# Patient Record
Sex: MALE | Race: WHITE | NOT HISPANIC OR LATINO | Employment: OTHER | URBAN - NONMETROPOLITAN AREA
[De-identification: names, ages, dates, MRNs, and addresses within clinical notes are randomized per-mention and may not be internally consistent; named-entity substitution may affect disease eponyms.]

---

## 2019-09-10 ENCOUNTER — OFFICE VISIT (OUTPATIENT)
Dept: FAMILY MEDICINE CLINIC | Facility: CLINIC | Age: 49
End: 2019-09-10

## 2019-09-10 VITALS
SYSTOLIC BLOOD PRESSURE: 122 MMHG | OXYGEN SATURATION: 98 % | BODY MASS INDEX: 37.64 KG/M2 | WEIGHT: 239.8 LBS | RESPIRATION RATE: 16 BRPM | HEART RATE: 83 BPM | TEMPERATURE: 98.3 F | DIASTOLIC BLOOD PRESSURE: 84 MMHG | HEIGHT: 67 IN

## 2019-09-10 DIAGNOSIS — R42 LIGHTHEADEDNESS: ICD-10-CM

## 2019-09-10 DIAGNOSIS — J45.20 MILD INTERMITTENT ASTHMA, UNSPECIFIED WHETHER COMPLICATED: ICD-10-CM

## 2019-09-10 DIAGNOSIS — Z13.29 SCREENING FOR THYROID DISORDER: ICD-10-CM

## 2019-09-10 DIAGNOSIS — Z13.220 SCREENING FOR LIPOID DISORDERS: ICD-10-CM

## 2019-09-10 DIAGNOSIS — Z13.0 SCREENING FOR DEFICIENCY ANEMIA: ICD-10-CM

## 2019-09-10 DIAGNOSIS — R73.03 PREDIABETES: Primary | ICD-10-CM

## 2019-09-10 LAB
POC CREATININE URINE: ABNORMAL
POC MICROALBUMIN URINE: ABNORMAL

## 2019-09-10 PROCEDURE — 82570 ASSAY OF URINE CREATININE: CPT | Performed by: FAMILY MEDICINE

## 2019-09-10 PROCEDURE — 82044 UR ALBUMIN SEMIQUANTITATIVE: CPT | Performed by: FAMILY MEDICINE

## 2019-09-10 PROCEDURE — 99204 OFFICE O/P NEW MOD 45 MIN: CPT | Performed by: FAMILY MEDICINE

## 2019-09-10 RX ORDER — ALBUTEROL SULFATE 90 UG/1
2 AEROSOL, METERED RESPIRATORY (INHALATION) EVERY 4 HOURS PRN
Qty: 18 G | Refills: 5 | Status: SHIPPED | OUTPATIENT
Start: 2019-09-10 | End: 2020-12-17 | Stop reason: SDUPTHER

## 2019-09-10 NOTE — PATIENT INSTRUCTIONS

## 2019-09-10 NOTE — PROGRESS NOTES
Subjective   Carlos Reese is a 48 y.o. male. Presents today to establish care to be evaluated for sporadic dizziness/lightheadness. Also would like to discuss med management for asthma, needing inhalers and nebulizer soultion. Patient requesting fasting labs.     History of Present Illness   New patient - No medical treatment in the past 3 years    Dizziness and lightheadedness- Hx of prior heat strokes.  Heat can cause him to have muscle spasms and feeling dizzy.  Heat from a bath can do this too.  Also a hx of tremors.      Asthma- Currently controlled.  Very few triggers with 1-2 attacks a year.  Triggered by acetone, fingernail polish, perfumes, or any other strong fumes.  No history of allergy triggers.  On no inhalers currently.      Prediabetes - Not checked in 3 years.  Last A1c 6.4.  He is not fasting today.  No new numbness of tingling.  No eye exam.  He has been drinking a lot of fluid more than usual and peeing a lot and wanting to eat.    The following portions of the patient's history were reviewed and updated as appropriate: allergies, current medications, past family history, past medical history, past social history, past surgical history and problem list.    Review of Systems   Constitutional: Negative for activity change, appetite change, fatigue and fever.   HENT: Negative for ear pain, facial swelling and sore throat.    Eyes: Negative for discharge and itching.   Respiratory: Negative for cough, chest tightness and shortness of breath.    Cardiovascular: Negative for chest pain and palpitations.   Gastrointestinal: Negative for abdominal distention and constipation.   Endocrine: Negative for polydipsia, polyphagia and polyuria.   Genitourinary: Negative for difficulty urinating and flank pain.   Musculoskeletal: Negative for arthralgias and back pain.   Skin: Negative for color change, rash and wound.   Allergic/Immunologic: Negative for environmental allergies and food allergies.    Neurological: Negative for weakness and numbness.   Hematological: Negative for adenopathy. Does not bruise/bleed easily.   Psychiatric/Behavioral: Negative for decreased concentration and dysphoric mood. The patient is not nervous/anxious.    All other systems reviewed and are negative.      Objective   Physical Exam   Constitutional: He is oriented to person, place, and time. He appears well-developed and well-nourished. No distress.   HENT:   Mouth/Throat: Oropharynx is clear and moist. No oropharyngeal exudate.   Eyes: Conjunctivae are normal. Right eye exhibits no discharge. Left eye exhibits no discharge.   Neck: Normal range of motion. Neck supple.   Cardiovascular: Normal rate, regular rhythm and normal heart sounds. Exam reveals no gallop and no friction rub.   No murmur heard.  Pulmonary/Chest: Effort normal and breath sounds normal. He has no wheezes. He has no rales.   Abdominal: Soft. Bowel sounds are normal. He exhibits no distension. There is no tenderness.   Musculoskeletal: He exhibits no edema or deformity.   Lymphadenopathy:     He has no cervical adenopathy.   Neurological: He is alert and oriented to person, place, and time.   Skin: Skin is warm and dry. No rash noted.   Psychiatric: He has a normal mood and affect. His behavior is normal.   Nursing note and vitals reviewed.      Assessment/Plan   Carlos was seen today for establish care, dizziness and med management.    Diagnoses and all orders for this visit:    Prediabetes  -     Comprehensive Metabolic Panel; Future  -     Hemoglobin A1c; Future  -     Hemoglobin A1c  -     Comprehensive Metabolic Panel  -     POCT microalbumin    Mild intermittent asthma, unspecified whether complicated  -     albuterol sulfate  (90 Base) MCG/ACT inhaler; Inhale 2 puffs Every 4 (Four) Hours As Needed for Wheezing.    Lightheadedness    Screening for lipoid disorders  -     Lipid Panel; Future  -     Lipid Panel    Screening for deficiency  anemia  -     CBC (No Diff); Future  -     CBC (No Diff)    Screening for thyroid disorder  -     TSH Rfx On Abnormal To Free T4; Future  -     TSH Rfx On Abnormal To Free T4      I will get some labs to look into the prediabetes as well as the lightheadedness.  I prescribed them a inhaler for the asthma.  I am very concerned that this is a turn into diabetes and will have him come back for a follow-up in the next week or so if it turns out that he is diabetic so we can get him on to treatment.

## 2019-09-12 LAB
ALBUMIN SERPL-MCNC: 4.4 G/DL (ref 3.5–5.2)
ALBUMIN/GLOB SERPL: 1.9 G/DL
ALP SERPL-CCNC: 60 U/L (ref 39–117)
ALT SERPL-CCNC: 44 U/L (ref 1–41)
AST SERPL-CCNC: 21 U/L (ref 1–40)
BILIRUB SERPL-MCNC: 0.4 MG/DL (ref 0.2–1.2)
BUN SERPL-MCNC: 14 MG/DL (ref 6–20)
BUN/CREAT SERPL: 23 (ref 7–25)
CALCIUM SERPL-MCNC: 9.1 MG/DL (ref 8.6–10.5)
CHLORIDE SERPL-SCNC: 100 MMOL/L (ref 98–107)
CHOLEST SERPL-MCNC: 194 MG/DL (ref 0–200)
CO2 SERPL-SCNC: 24.5 MMOL/L (ref 22–29)
CREAT SERPL-MCNC: 0.61 MG/DL (ref 0.76–1.27)
ERYTHROCYTE [DISTWIDTH] IN BLOOD BY AUTOMATED COUNT: 12.7 % (ref 12.3–15.4)
GLOBULIN SER CALC-MCNC: 2.3 GM/DL
GLUCOSE SERPL-MCNC: 292 MG/DL (ref 65–99)
HBA1C MFR BLD: 11.1 % (ref 4.8–5.6)
HCT VFR BLD AUTO: 47.3 % (ref 37.5–51)
HDLC SERPL-MCNC: 31 MG/DL (ref 40–60)
HGB BLD-MCNC: 14.7 G/DL (ref 13–17.7)
LDLC SERPL CALC-MCNC: 92 MG/DL (ref 0–100)
MCH RBC QN AUTO: 28.3 PG (ref 26.6–33)
MCHC RBC AUTO-ENTMCNC: 31.1 G/DL (ref 31.5–35.7)
MCV RBC AUTO: 91 FL (ref 79–97)
PLATELET # BLD AUTO: 217 10*3/MM3 (ref 140–450)
POTASSIUM SERPL-SCNC: 4.6 MMOL/L (ref 3.5–5.2)
PROT SERPL-MCNC: 6.7 G/DL (ref 6–8.5)
RBC # BLD AUTO: 5.2 10*6/MM3 (ref 4.14–5.8)
SODIUM SERPL-SCNC: 139 MMOL/L (ref 136–145)
TRIGL SERPL-MCNC: 354 MG/DL (ref 0–150)
TSH SERPL DL<=0.005 MIU/L-ACNC: 1.86 UIU/ML (ref 0.27–4.2)
VLDLC SERPL CALC-MCNC: 70.8 MG/DL
WBC # BLD AUTO: 7.37 10*3/MM3 (ref 3.4–10.8)

## 2019-09-17 ENCOUNTER — TELEPHONE (OUTPATIENT)
Dept: FAMILY MEDICINE CLINIC | Facility: CLINIC | Age: 49
End: 2019-09-17

## 2019-09-17 ENCOUNTER — OFFICE VISIT (OUTPATIENT)
Dept: FAMILY MEDICINE CLINIC | Facility: CLINIC | Age: 49
End: 2019-09-17

## 2019-09-17 VITALS
HEART RATE: 77 BPM | WEIGHT: 242.2 LBS | DIASTOLIC BLOOD PRESSURE: 84 MMHG | TEMPERATURE: 98.6 F | RESPIRATION RATE: 16 BRPM | SYSTOLIC BLOOD PRESSURE: 128 MMHG | BODY MASS INDEX: 38.01 KG/M2 | HEIGHT: 67 IN | OXYGEN SATURATION: 99 %

## 2019-09-17 DIAGNOSIS — E78.49 OTHER HYPERLIPIDEMIA: ICD-10-CM

## 2019-09-17 DIAGNOSIS — Z23 NEED FOR PNEUMOCOCCAL VACCINATION: ICD-10-CM

## 2019-09-17 DIAGNOSIS — E11.8 TYPE 2 DIABETES MELLITUS WITH COMPLICATION, WITHOUT LONG-TERM CURRENT USE OF INSULIN (HCC): Primary | ICD-10-CM

## 2019-09-17 PROCEDURE — 90471 IMMUNIZATION ADMIN: CPT | Performed by: FAMILY MEDICINE

## 2019-09-17 PROCEDURE — 99215 OFFICE O/P EST HI 40 MIN: CPT | Performed by: FAMILY MEDICINE

## 2019-09-17 PROCEDURE — 90670 PCV13 VACCINE IM: CPT | Performed by: FAMILY MEDICINE

## 2019-09-17 NOTE — PATIENT INSTRUCTIONS

## 2019-09-17 NOTE — PROGRESS NOTES
Subjective   Carlos Reese is a 48 y.o. male. Presents today for follow up on recent labs for diabetes and hyperlidemia.     History of Present Illness     Diabetes mellitus- new diagnosis.  No new numbness, tingling.  In need of eye exam.  Patient's A1c was greater than 11.  This is his first instance of this diagnosis.  He says he is ready to make some lifestyle changes as far as his weight and diet.     HLD- new diagnosis.  Patient is morbidly obese and and says that he is not been eating too unhealthy but has no direction as far as how to eat more healthy.  He is willing to see a nutritionist to help with that.      The following portions of the patient's history were reviewed and updated as appropriate: allergies, current medications, past family history, past medical history, past social history, past surgical history and problem list.    Review of Systems   Constitutional: Negative for activity change, appetite change, fatigue and fever.   HENT: Negative for ear pain, facial swelling and sore throat.    Eyes: Negative for discharge and itching.   Respiratory: Negative for cough, chest tightness and shortness of breath.    Cardiovascular: Negative for chest pain and palpitations.   Gastrointestinal: Negative for abdominal distention and constipation.   Endocrine: Negative for polydipsia, polyphagia and polyuria.   Genitourinary: Negative for difficulty urinating and flank pain.   Musculoskeletal: Negative for arthralgias and back pain.   Skin: Negative for color change, rash and wound.   Allergic/Immunologic: Negative for environmental allergies and food allergies.   Neurological: Negative for weakness and numbness.   Hematological: Negative for adenopathy. Does not bruise/bleed easily.   Psychiatric/Behavioral: Negative for decreased concentration and dysphoric mood. The patient is not nervous/anxious.    All other systems reviewed and are negative.      Objective   Physical Exam   Constitutional: He  appears well-developed and well-nourished. No distress.   Cardiovascular: Normal rate, regular rhythm and normal heart sounds. Exam reveals no gallop and no friction rub.   No murmur heard.  Pulmonary/Chest: Effort normal and breath sounds normal. He has no wheezes. He has no rales.   Abdominal: Soft. Bowel sounds are normal. He exhibits no distension. There is no tenderness.   Skin: Skin is warm. Capillary refill takes less than 2 seconds. He is not diaphoretic.   Nursing note and vitals reviewed.      Assessment/Plan   Carlos was seen today for follow-up.    Diagnoses and all orders for this visit:    Type 2 diabetes mellitus with complication, without long-term current use of insulin (CMS/[facility])  -     metFORMIN (GLUCOPHAGE) 500 MG tablet; Take 1 tablet by mouth 2 (Two) Times a Day With Meals.  -     Ambulatory Referral to Nutrition Services  -     Ambulatory Referral for Diabetic Eye Exam-Ophthalmology  -     glucose blood test strip; Use as instructed  -     Blood Glucose Monitoring Suppl w/Device kit; 1 each Daily.    Other hyperlipidemia    Need for pneumococcal vaccination  -     pneumococcal conj. 13-valent (PREVNAR-13) vaccine 0.5 mL    I spent 40 minutes in the room with greater than 50% of the time counseling the patient regarding counseling the patient on his new diagnosis of type 2 diabetes mellitus and explaining the pathophysiology along with treatment options including medications such as metformin and also doing lifestyle changes such as healthier eating and trying to lose some weight.  I am going to refer him for a diabetic eye exam and we talked about the importance of eye exam sent preventing diabetic retinopathy we also talked about the importance of nutrition services referral.  We talked about ranges of glucose that I want to see as far as testing goes but he does not need to test every single day.  We also talked about making sure to stay on top of his vaccines and we gave him his Prevnar  today.

## 2019-10-17 ENCOUNTER — OFFICE VISIT (OUTPATIENT)
Dept: FAMILY MEDICINE CLINIC | Facility: CLINIC | Age: 49
End: 2019-10-17

## 2019-10-17 VITALS
SYSTOLIC BLOOD PRESSURE: 128 MMHG | DIASTOLIC BLOOD PRESSURE: 84 MMHG | TEMPERATURE: 98.1 F | WEIGHT: 237.2 LBS | HEIGHT: 67 IN | OXYGEN SATURATION: 99 % | BODY MASS INDEX: 37.23 KG/M2 | HEART RATE: 77 BPM | RESPIRATION RATE: 16 BRPM

## 2019-10-17 DIAGNOSIS — E11.8 TYPE 2 DIABETES MELLITUS WITH COMPLICATION, WITHOUT LONG-TERM CURRENT USE OF INSULIN (HCC): Primary | ICD-10-CM

## 2019-10-17 LAB — GLUCOSE BLDC GLUCOMTR-MCNC: 199 MG/DL (ref 70–130)

## 2019-10-17 PROCEDURE — 82962 GLUCOSE BLOOD TEST: CPT | Performed by: FAMILY MEDICINE

## 2019-10-17 PROCEDURE — 99213 OFFICE O/P EST LOW 20 MIN: CPT | Performed by: FAMILY MEDICINE

## 2019-10-17 RX ORDER — LANCETS 33 GAUGE
EACH MISCELLANEOUS 2 TIMES DAILY
Refills: 12 | COMMUNITY
Start: 2019-09-17

## 2019-10-17 NOTE — PATIENT INSTRUCTIONS

## 2019-10-17 NOTE — PROGRESS NOTES
Subjective   Carlos Reese is a 48 y.o. male. Presents today for one month follow up on diabetes mellitus.     History of Present Illness     Diabetes mellitus-stable.  Patient taking medication as prescribed.  No new numbness, tingling.  Eye exam is up-to-date.  Patient taking metformin 500mg BID.  Lost 5 lbs and glucose is 100 points lower than last value.  Testing at random but will start testing 3 times a week.      The following portions of the patient's history were reviewed and updated as appropriate: allergies, current medications, past family history, past medical history, past social history, past surgical history and problem list.    Review of Systems   Constitutional: Negative for activity change, appetite change, fatigue and fever.   HENT: Negative for ear pain, facial swelling and sore throat.    Eyes: Negative for discharge and itching.   Respiratory: Negative for cough, chest tightness and shortness of breath.    Cardiovascular: Negative for chest pain and palpitations.   Gastrointestinal: Negative for abdominal distention and constipation.   Endocrine: Negative for polydipsia, polyphagia and polyuria.   Genitourinary: Negative for difficulty urinating and flank pain.   Musculoskeletal: Negative for arthralgias and back pain.   Skin: Negative for color change, rash and wound.   Allergic/Immunologic: Negative for environmental allergies and food allergies.   Neurological: Negative for weakness and numbness.   Hematological: Negative for adenopathy. Does not bruise/bleed easily.   Psychiatric/Behavioral: Negative for decreased concentration and dysphoric mood. The patient is not nervous/anxious.    All other systems reviewed and are negative.      Objective   Physical Exam   Constitutional: He appears well-developed and well-nourished. No distress.   Cardiovascular: Normal rate, regular rhythm and normal heart sounds. Exam reveals no gallop and no friction rub.   No murmur heard.  Pulmonary/Chest:  Effort normal and breath sounds normal. He has no wheezes. He has no rales.   Skin: Skin is warm. Capillary refill takes less than 2 seconds. He is not diaphoretic.   Nursing note and vitals reviewed.      Assessment/Plan   Carlos was seen today for follow-up.    Diagnoses and all orders for this visit:    Type 2 diabetes mellitus with complication, without long-term current use of insulin (CMS/[facility])  -     POCT Glucose    Point-of-care glucose was 100 points lower than last time.  Appears that he is improving.  I am planning on giving him some more time to get some more weight off and also to get his sugar to come down further.  We will see him back in a couple months for his A1c check and at that time will make some decisions as far as medications and what else to do.  I did mention to him that we may need to put him on some type of cholesterol medicine in the future but right now he was just barely elevated and I am to give him a chance with his BMI to come down.

## 2020-02-14 ENCOUNTER — OFFICE VISIT (OUTPATIENT)
Dept: FAMILY MEDICINE CLINIC | Facility: CLINIC | Age: 50
End: 2020-02-14

## 2020-02-14 VITALS
RESPIRATION RATE: 16 BRPM | DIASTOLIC BLOOD PRESSURE: 72 MMHG | BODY MASS INDEX: 37.29 KG/M2 | OXYGEN SATURATION: 97 % | SYSTOLIC BLOOD PRESSURE: 114 MMHG | HEIGHT: 67 IN | WEIGHT: 237.6 LBS | TEMPERATURE: 97.9 F | HEART RATE: 83 BPM

## 2020-02-14 DIAGNOSIS — J06.9 VIRAL UPPER RESPIRATORY ILLNESS: Primary | ICD-10-CM

## 2020-02-14 DIAGNOSIS — R11.0 NAUSEA: ICD-10-CM

## 2020-02-14 LAB
EXPIRATION DATE: NORMAL
FLUAV AG NPH QL: NEGATIVE
FLUBV AG NPH QL: NEGATIVE
INTERNAL CONTROL: NORMAL
Lab: NORMAL

## 2020-02-14 PROCEDURE — 87804 INFLUENZA ASSAY W/OPTIC: CPT | Performed by: FAMILY MEDICINE

## 2020-02-14 PROCEDURE — 99213 OFFICE O/P EST LOW 20 MIN: CPT | Performed by: FAMILY MEDICINE

## 2020-02-14 RX ORDER — ONDANSETRON 4 MG/1
4 TABLET, ORALLY DISINTEGRATING ORAL EVERY 6 HOURS PRN
Qty: 20 TABLET | Refills: 1 | Status: SHIPPED | OUTPATIENT
Start: 2020-02-14 | End: 2020-05-11

## 2020-02-14 NOTE — PROGRESS NOTES
Subjective   Carlos Reese is a 49 y.o. male. Presents today to be evaluated for chills, nausea, body aches, and headaches x 2 days.    History of Present Illness     Upper Respiratory Infection  Patient complains of symptoms of a URI. Symptoms include achiness, congestion, headache described as generalized and low grade fever. Onset of symptoms was 2 days ago, and has been rapidly worsening since that time. Treatment to date: none.  Nothing seems make it better or worse.  He currently is unemployed.  Unknown exposure except to maybe a family member with a viral illness.    The following portions of the patient's history were reviewed and updated as appropriate: allergies, current medications, past family history, past medical history, past social history, past surgical history and problem list.    Review of Systems   Constitutional: Positive for chills and fatigue.        Body aches   Gastrointestinal: Positive for nausea.   Neurological: Positive for headaches.   All other systems reviewed and are negative.      Objective   Physical Exam   Constitutional: No distress.   Ill-appearing but not toxic   HENT:   Right Ear: Hearing, tympanic membrane, external ear and ear canal normal.   Left Ear: Hearing, tympanic membrane, external ear and ear canal normal.   Nose: Nose normal. Right sinus exhibits no maxillary sinus tenderness and no frontal sinus tenderness. Left sinus exhibits no maxillary sinus tenderness and no frontal sinus tenderness.   Mouth/Throat: Uvula is midline, oropharynx is clear and moist and mucous membranes are normal.   Eyes: Conjunctivae are normal. Right eye exhibits no discharge. Left eye exhibits no discharge.   Neck: Neck supple.   Cardiovascular: Normal rate, regular rhythm and normal heart sounds. Exam reveals no gallop and no friction rub.   No murmur heard.  Pulmonary/Chest: Effort normal and breath sounds normal. He has no wheezes. He has no rales.   Lymphadenopathy:     He has no  cervical adenopathy.   Skin: He is not diaphoretic.   Nursing note and vitals reviewed.  Flu negative    Assessment/Plan   Carlos was seen today for nausea, chills, generalized body aches and headache.    Diagnoses and all orders for this visit:    Viral upper respiratory illness    Nausea  -     POCT Influenza A/B  -     ondansetron ODT (ZOFRAN-ODT) 4 MG disintegrating tablet; Take 1 tablet by mouth Every 6 (Six) Hours As Needed for Nausea or Vomiting.    Sent in the patient some Zofran ODT for his nausea.  He has over-the-counter products at home such as ibuprofen and Tylenol he can rotate.  He is not working right now so he has had to go home and rest.    I spent approximately 15 minutes in the room with greater than 50% of the time counseling.  We discussed that the patient's symptoms are the result of a virus and it will take some time for it to run its course.  I explained that there are many over-the-counter things that they could try in addition to any medication that I could prescribe you.  If your symptoms become worse or they take a different course than what we have discussed to call the office for further information.

## 2020-02-14 NOTE — PATIENT INSTRUCTIONS
Viral Respiratory Infection  A viral respiratory infection is an illness that affects parts of the body that are used for breathing. These include the lungs, nose, and throat. It is caused by a germ called a virus.  Some examples of this kind of infection are:  · A cold.  · The flu (influenza).  · A respiratory syncytial virus (RSV) infection.  A person who gets this illness may have the following symptoms:  · A stuffy or runny nose.  · Yellow or green fluid in the nose.  · A cough.  · Sneezing.  · Tiredness (fatigue).  · Achy muscles.  · A sore throat.  · Sweating or chills.  · A fever.  · A headache.  Follow these instructions at home:  Managing pain and congestion  · Take over-the-counter and prescription medicines only as told by your doctor.  · If you have a sore throat, gargle with salt water. Do this 3-4 times per day or as needed. To make a salt-water mixture, dissolve ½-1 tsp of salt in 1 cup of warm water. Make sure that all the salt dissolves.  · Use nose drops made from salt water. This helps with stuffiness (congestion). It also helps soften the skin around your nose.  · Drink enough fluid to keep your pee (urine) pale yellow.  General instructions    · Rest as much as possible.  · Do not drink alcohol.  · Do not use any products that have nicotine or tobacco, such as cigarettes and e-cigarettes. If you need help quitting, ask your doctor.  · Keep all follow-up visits as told by your doctor. This is important.  How is this prevented?    · Get a flu shot every year. Ask your doctor when you should get your flu shot.  · Do not let other people get your germs. If you are sick:  ? Stay home from work or school.  ? Wash your hands with soap and water often. Wash your hands after you cough or sneeze. If soap and water are not available, use hand .  · Avoid contact with people who are sick during cold and flu season. This is in fall and winter.  Get help if:  · Your symptoms last for 10 days or  longer.  · Your symptoms get worse over time.  · You have a fever.  · You have very bad pain in your face or forehead.  · Parts of your jaw or neck become very swollen.  Get help right away if:  · You feel pain or pressure in your chest.  · You have shortness of breath.  · You faint or feel like you will faint.  · You keep throwing up (vomiting).  · You feel confused.  Summary  · A viral respiratory infection is an illness that affects parts of the body that are used for breathing.  · Examples of this illness include a cold, the flu, and respiratory syncytial virus (RSV) infection.  · The infection can cause a runny nose, cough, sneezing, sore throat, and fever.  · Follow what your doctor tells you about taking medicines, drinking lots of fluid, washing your hands, resting at home, and avoiding people who are sick.  This information is not intended to replace advice given to you by your health care provider. Make sure you discuss any questions you have with your health care provider.  Document Released: 11/30/2009 Document Revised: 01/28/2019 Document Reviewed: 01/28/2019  [employer] Patient Education © 2019 [employer]

## 2020-02-20 DIAGNOSIS — E11.8 TYPE 2 DIABETES MELLITUS WITH COMPLICATION, WITHOUT LONG-TERM CURRENT USE OF INSULIN (HCC): ICD-10-CM

## 2020-05-11 ENCOUNTER — OFFICE VISIT (OUTPATIENT)
Dept: FAMILY MEDICINE CLINIC | Facility: CLINIC | Age: 50
End: 2020-05-11

## 2020-05-11 VITALS
HEART RATE: 86 BPM | BODY MASS INDEX: 37.04 KG/M2 | WEIGHT: 236 LBS | DIASTOLIC BLOOD PRESSURE: 82 MMHG | OXYGEN SATURATION: 98 % | RESPIRATION RATE: 16 BRPM | HEIGHT: 67 IN | TEMPERATURE: 97.3 F | SYSTOLIC BLOOD PRESSURE: 130 MMHG

## 2020-05-11 DIAGNOSIS — E11.8 TYPE 2 DIABETES MELLITUS WITH COMPLICATION, WITHOUT LONG-TERM CURRENT USE OF INSULIN (HCC): ICD-10-CM

## 2020-05-11 DIAGNOSIS — E78.49 OTHER HYPERLIPIDEMIA: ICD-10-CM

## 2020-05-11 DIAGNOSIS — M54.50 LUMBAR SPINE PAIN: Primary | ICD-10-CM

## 2020-05-11 PROCEDURE — 99214 OFFICE O/P EST MOD 30 MIN: CPT | Performed by: FAMILY MEDICINE

## 2020-05-11 RX ORDER — HYDROCODONE BITARTRATE AND ACETAMINOPHEN 7.5; 325 MG/1; MG/1
1 TABLET ORAL EVERY 8 HOURS PRN
Qty: 15 TABLET | Refills: 0 | Status: SHIPPED | OUTPATIENT
Start: 2020-05-11 | End: 2020-08-20

## 2020-05-11 NOTE — PATIENT INSTRUCTIONS
Acute Back Pain, Adult  Acute back pain is sudden and usually short-lived. It is often caused by an injury to the muscles and tissues in the back. The injury may result from:  · A muscle or ligament getting overstretched or torn (strained). Ligaments are tissues that connect bones to each other. Lifting something improperly can cause a back strain.  · Wear and tear (degeneration) of the spinal disks. Spinal disks are circular tissue that provides cushioning between the bones of the spine (vertebrae).  · Twisting motions, such as while playing sports or doing yard work.  · A hit to the back.  · Arthritis.  You may have a physical exam, lab tests, and imaging tests to find the cause of your pain. Acute back pain usually goes away with rest and home care.  Follow these instructions at home:  Managing pain, stiffness, and swelling  · Take over-the-counter and prescription medicines only as told by your health care provider.  · Your health care provider may recommend applying ice during the first 24-48 hours after your pain starts. To do this:  ? Put ice in a plastic bag.  ? Place a towel between your skin and the bag.  ? Leave the ice on for 20 minutes, 2-3 times a day.  · If directed, apply heat to the affected area as often as told by your health care provider. Use the heat source that your health care provider recommends, such as a moist heat pack or a heating pad.  ? Place a towel between your skin and the heat source.  ? Leave the heat on for 20-30 minutes.  ? Remove the heat if your skin turns bright red. This is especially important if you are unable to feel pain, heat, or cold. You have a greater risk of getting burned.  Activity    · Do not stay in bed. Staying in bed for more than 1-2 days can delay your recovery.  · Sit up and stand up straight. Avoid leaning forward when you sit, or hunching over when you stand.  ? If you work at a desk, sit close to it so you do not need to lean over. Keep your chin tucked  "in. Keep your neck drawn back, and keep your elbows bent at a right angle. Your arms should look like the letter \"L.\"  ? Sit high and close to the steering wheel when you drive. Add lower back (lumbar) support to your car seat, if needed.  · Take short walks on even surfaces as soon as you are able. Try to increase the length of time you walk each day.  · Do not sit, drive, or  one place for more than 30 minutes at a time. Sitting or standing for long periods of time can put stress on your back.  · Do not drive or use heavy machinery while taking prescription pain medicine.  · Use proper lifting techniques. When you bend and lift, use positions that put less stress on your back:  ? Bend your knees.  ? Keep the load close to your body.  ? Avoid twisting.  · Exercise regularly as told by your health care provider. Exercising helps your back heal faster and helps prevent back injuries by keeping muscles strong and flexible.  · Work with a physical therapist to make a safe exercise program, as recommended by your health care provider. Do any exercises as told by your physical therapist.  Lifestyle  · Maintain a healthy weight. Extra weight puts stress on your back and makes it difficult to have good posture.  · Avoid activities or situations that make you feel anxious or stressed. Stress and anxiety increase muscle tension and can make back pain worse. Learn ways to manage anxiety and stress, such as through exercise.  General instructions  · Sleep on a firm mattress in a comfortable position. Try lying on your side with your knees slightly bent. If you lie on your back, put a pillow under your knees.  · Follow your treatment plan as told by your health care provider. This may include:  ? Cognitive or behavioral therapy.  ? Acupuncture or massage therapy.  ? Meditation or yoga.  Contact a health care provider if:  · You have pain that is not relieved with rest or medicine.  · You have increasing pain going down " into your legs or buttocks.  · Your pain does not improve after 2 weeks.  · You have pain at night.  · You lose weight without trying.  · You have a fever or chills.  Get help right away if:  · You develop new bowel or bladder control problems.  · You have unusual weakness or numbness in your arms or legs.  · You develop nausea or vomiting.  · You develop abdominal pain.  · You feel faint.  Summary  · Acute back pain is sudden and usually short-lived.  · Use proper lifting techniques. When you bend and lift, use positions that put less stress on your back.  · Take over-the-counter and prescription medicines and apply heat or ice as directed by your health care provider.  This information is not intended to replace advice given to you by your health care provider. Make sure you discuss any questions you have with your health care provider.  Document Released: 12/18/2006 Document Revised: 07/25/2019 Document Reviewed: 08/01/2018  [employer] Interactive Patient Education © 2020 [employer]
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
## 2020-05-15 ENCOUNTER — OFFICE VISIT (OUTPATIENT)
Dept: FAMILY MEDICINE CLINIC | Facility: CLINIC | Age: 50
End: 2020-05-15

## 2020-05-15 DIAGNOSIS — M54.16 LUMBAR RADICULOPATHY, RIGHT: ICD-10-CM

## 2020-05-15 DIAGNOSIS — E11.8 TYPE 2 DIABETES MELLITUS WITH COMPLICATION, WITHOUT LONG-TERM CURRENT USE OF INSULIN (HCC): ICD-10-CM

## 2020-05-15 DIAGNOSIS — M54.50 LUMBAR SPINE PAIN: Primary | ICD-10-CM

## 2020-05-15 DIAGNOSIS — E78.49 OTHER HYPERLIPIDEMIA: ICD-10-CM

## 2020-05-15 LAB
ALBUMIN SERPL-MCNC: 4.5 G/DL (ref 3.5–5.2)
ALBUMIN/GLOB SERPL: 1.8 G/DL
ALP SERPL-CCNC: 58 U/L (ref 39–117)
ALT SERPL-CCNC: 64 U/L (ref 1–41)
AST SERPL-CCNC: 24 U/L (ref 1–40)
BILIRUB SERPL-MCNC: 0.4 MG/DL (ref 0.2–1.2)
BUN SERPL-MCNC: 10 MG/DL (ref 6–20)
BUN/CREAT SERPL: 15.4 (ref 7–25)
CALCIUM SERPL-MCNC: 9.9 MG/DL (ref 8.6–10.5)
CHLORIDE SERPL-SCNC: 97 MMOL/L (ref 98–107)
CHOLEST SERPL-MCNC: 193 MG/DL (ref 0–200)
CO2 SERPL-SCNC: 26.4 MMOL/L (ref 22–29)
CREAT SERPL-MCNC: 0.65 MG/DL (ref 0.76–1.27)
ERYTHROCYTE [DISTWIDTH] IN BLOOD BY AUTOMATED COUNT: 12.6 % (ref 12.3–15.4)
GLOBULIN SER CALC-MCNC: 2.5 GM/DL
GLUCOSE SERPL-MCNC: 303 MG/DL (ref 65–99)
HBA1C MFR BLD: 11 % (ref 4.8–5.6)
HCT VFR BLD AUTO: 46.4 % (ref 37.5–51)
HDLC SERPL-MCNC: 32 MG/DL (ref 40–60)
HGB BLD-MCNC: 15.4 G/DL (ref 13–17.7)
LDLC SERPL CALC-MCNC: ABNORMAL MG/DL
MCH RBC QN AUTO: 29.6 PG (ref 26.6–33)
MCHC RBC AUTO-ENTMCNC: 33.2 G/DL (ref 31.5–35.7)
MCV RBC AUTO: 89.2 FL (ref 79–97)
PLATELET # BLD AUTO: 190 10*3/MM3 (ref 140–450)
POTASSIUM SERPL-SCNC: 4.3 MMOL/L (ref 3.5–5.2)
PROT SERPL-MCNC: 7 G/DL (ref 6–8.5)
RBC # BLD AUTO: 5.2 10*6/MM3 (ref 4.14–5.8)
SODIUM SERPL-SCNC: 136 MMOL/L (ref 136–145)
TRIGL SERPL-MCNC: 468 MG/DL (ref 0–150)
VLDLC SERPL CALC-MCNC: ABNORMAL MG/DL
WBC # BLD AUTO: 7.6 10*3/MM3 (ref 3.4–10.8)

## 2020-05-15 PROCEDURE — 99213 OFFICE O/P EST LOW 20 MIN: CPT | Performed by: FAMILY MEDICINE

## 2020-05-15 NOTE — PROGRESS NOTES
Subjective   Carlos Reese is a 49 y.o. male. Review of labs via telephone visit and review of MRI of spine.    You have chosen to receive care through a telephone visit. Do you consent to use a telephone visit for your medical care today? Yes      History of Present Illness     Diabetes-his A1c is 11.0 and his sugars are in the 300s.  His number has not but since the last time.    Hyperlipidemia-triglycerides are so high that it is unable to calculate the LDL.  He is on a medication for that currently and eating very poorly    Lumbar spine pain and radiculopathy-the x-ray showed some areas around L5-S1 where he is got some narrowing of his vertebra and they recommended a lumbar MRI with radiculopathy.    The following portions of the patient's history were reviewed and updated as appropriate: allergies, current medications, past family history, past medical history, past social history, past surgical history and problem list.    Review of Systems   Musculoskeletal: Positive for back pain.       Objective   Physical Exam   N/A    Assessment/Plan   Diagnoses and all orders for this visit:    Lumbar spine pain  -     MRI Lumbar Spine Without Contrast; Future    Lumbar radiculopathy, right  -     MRI Lumbar Spine Without Contrast; Future    Type 2 diabetes mellitus with complication, without long-term current use of insulin (CMS/[facility])  -     metFORMIN (GLUCOPHAGE) 1000 MG tablet; Take 1 tablet by mouth 2 (Two) Times a Day With Meals.  -     SITagliptin (Januvia) 100 MG tablet; Take 1 tablet by mouth Daily.    Other hyperlipidemia      Plan as above.  Ordered the lumbar MRI.  Will increase metformin and add Januvia.  I stated that if these numbers are not any better by next time that we will be likely sending him to endocrine.  Went over some dietary possibilities to change for his hyperlipidemia but I think we need to work on the sugar more quickly.  Follow-up in 3 months.    I spent 10 minutes on the call and  another 7 minutes completing the encounter along with research.

## 2020-05-20 ENCOUNTER — TELEPHONE (OUTPATIENT)
Dept: FAMILY MEDICINE CLINIC | Facility: CLINIC | Age: 50
End: 2020-05-20

## 2020-05-20 NOTE — TELEPHONE ENCOUNTER
PT ADVISED THAT THE PHARMACY TOLD HIM THAT HE NEEDS A PA FOR SITagliptin (Januvia) 100 MG tablet.

## 2020-05-21 ENCOUNTER — TELEPHONE (OUTPATIENT)
Dept: FAMILY MEDICINE CLINIC | Facility: CLINIC | Age: 50
End: 2020-05-21

## 2020-05-21 DIAGNOSIS — E11.8 TYPE 2 DIABETES MELLITUS WITH COMPLICATION, WITHOUT LONG-TERM CURRENT USE OF INSULIN (HCC): Primary | ICD-10-CM

## 2020-05-21 RX ORDER — ALOGLIPTIN 25 MG/1
1 TABLET, FILM COATED ORAL DAILY
Qty: 30 TABLET | Refills: 5 | Status: SHIPPED | OUTPATIENT
Start: 2020-05-21 | End: 2021-01-04

## 2020-05-21 NOTE — TELEPHONE ENCOUNTER
PATIENT CALLED AND WOULD LIKE TO KNOW IF HE IS TO START TAKING 1000MG OF THE METFORMIN EVEN THOUGH HE HAS NOT STARTED THE JANUVIA YET.     PATIENT CALLBACK [phone]

## 2020-05-21 NOTE — TELEPHONE ENCOUNTER
Spoke with patient. He will start taking new medication that was sent to pharmacy today per Dr. Rodriguez along with metformin.

## 2020-05-24 ENCOUNTER — HOSPITAL ENCOUNTER (OUTPATIENT)
Dept: GENERAL RADIOLOGY | Facility: HOSPITAL | Age: 50
Discharge: HOME | End: 2020-05-24

## 2020-05-24 ENCOUNTER — HOSPITAL ENCOUNTER (OUTPATIENT)
Dept: MRI IMAGING | Facility: HOSPITAL | Age: 50
Discharge: HOME | End: 2020-05-24
Admitting: FAMILY MEDICINE

## 2020-05-24 DIAGNOSIS — M54.50 LUMBAR SPINE PAIN: ICD-10-CM

## 2020-05-24 DIAGNOSIS — M54.16 LUMBAR RADICULOPATHY, RIGHT: ICD-10-CM

## 2020-05-24 PROCEDURE — 72110 X-RAY EXAM L-2 SPINE 4/>VWS: CPT

## 2020-05-24 PROCEDURE — 72148 MRI LUMBAR SPINE W/O DYE: CPT

## 2020-05-28 ENCOUNTER — TELEPHONE (OUTPATIENT)
Dept: FAMILY MEDICINE CLINIC | Facility: CLINIC | Age: 50
End: 2020-05-28

## 2020-05-28 DIAGNOSIS — M54.16 LUMBAR RADICULOPATHY, RIGHT: Primary | ICD-10-CM

## 2020-05-28 DIAGNOSIS — M54.50 LUMBAR SPINE PAIN: ICD-10-CM

## 2020-05-28 NOTE — TELEPHONE ENCOUNTER
PT CALLED REQUESTING HIS MRI AND XRAY RESULTS COMPLETED Sunday 05/24 ON HIS LOWER BACK.     PLEASE ADVISE     PT CALL BACK   [phone]

## 2020-05-28 NOTE — TELEPHONE ENCOUNTER
Called pt to give him results per your mychart message he said he would like to try PT and pain management.
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
## 2020-06-16 ENCOUNTER — OFFICE VISIT (OUTPATIENT)
Dept: PAIN MEDICINE | Facility: CLINIC | Age: 50
End: 2020-06-16

## 2020-06-16 ENCOUNTER — RESULTS ENCOUNTER (OUTPATIENT)
Dept: PAIN MEDICINE | Facility: CLINIC | Age: 50
End: 2020-06-16

## 2020-06-16 VITALS
OXYGEN SATURATION: 96 % | SYSTOLIC BLOOD PRESSURE: 141 MMHG | RESPIRATION RATE: 18 BRPM | HEART RATE: 80 BPM | WEIGHT: 244.4 LBS | BODY MASS INDEX: 38.36 KG/M2 | HEIGHT: 67 IN | DIASTOLIC BLOOD PRESSURE: 85 MMHG | TEMPERATURE: 97.3 F

## 2020-06-16 DIAGNOSIS — M47.816 LUMBAR FACET JOINT SYNDROME: ICD-10-CM

## 2020-06-16 DIAGNOSIS — G89.4 CHRONIC PAIN SYNDROME: ICD-10-CM

## 2020-06-16 DIAGNOSIS — M47.816 OSTEOARTHRITIS OF LUMBAR SPINE, UNSPECIFIED SPINAL OSTEOARTHRITIS COMPLICATION STATUS: ICD-10-CM

## 2020-06-16 DIAGNOSIS — M47.816 OSTEOARTHRITIS OF LUMBAR SPINE, UNSPECIFIED SPINAL OSTEOARTHRITIS COMPLICATION STATUS: Primary | ICD-10-CM

## 2020-06-16 DIAGNOSIS — E11.8 TYPE 2 DIABETES MELLITUS WITH COMPLICATION, WITHOUT LONG-TERM CURRENT USE OF INSULIN (HCC): ICD-10-CM

## 2020-06-16 LAB
POC AMPHETAMINES: NEGATIVE
POC BARBITURATES: NEGATIVE
POC BENZODIAZEPHINES: NEGATIVE
POC COCAINE: NEGATIVE
POC METHADONE: NEGATIVE
POC METHAMPHETAMINE SCREEN URINE: NEGATIVE
POC OPIATES: NEGATIVE
POC OXYCODONE: NEGATIVE
POC PHENCYCLIDINE: NEGATIVE
POC PROPOXYPHENE: NEGATIVE
POC THC: NEGATIVE
POC TRICYCLIC ANTIDEPRESSANTS: NEGATIVE

## 2020-06-16 PROCEDURE — 80305 DRUG TEST PRSMV DIR OPT OBS: CPT | Performed by: ANESTHESIOLOGY

## 2020-06-16 PROCEDURE — 99204 OFFICE O/P NEW MOD 45 MIN: CPT | Performed by: ANESTHESIOLOGY

## 2020-06-16 NOTE — PATIENT INSTRUCTIONS
Facet Syndrome    Facet syndrome is a condition in which joints (facet joints) that connect the bones of the spine (vertebrae) become damaged. Facet joints help the spine move, and they wear down (degenerate) or become inflamed as a person gets older. This can cause pain and stiffness in the neck (cervical facet syndrome) or in the lower back (lumbar facet syndrome).  When a facet joint becomes damaged, a vertebra may slip forward, out of its normal place in the spine. Damage to a facet joint can also damage nerves near the spine, which can cause tingling or weakness in the arms or legs. Facet syndrome can make it difficult to turn the head or bend backward without pain. This condition usually gets worse over time.  What are the causes?  Common causes of this condition include:  · Age-related inflammation of the facet joints (arthritis) that may create extra bone on the joint surface (bone spurs).  · Age-related decrease in space between the vertebrae (disk degeneration and cartilage degeneration).  · Repetitive stress on the spine, such as repetitive twisting of the back.  · Injury to the back or neck.  · Poor posture.  · Being overweight or obese.  What increases the risk?  The following factors may make you more likely to develop this condition:  · Playing contact sports.  · Doing activities or sports that involve repetitive twisting motions or repetitive heavy lifting.  · Having poor back strength and flexibility.  · Having another back or spine condition, such as scoliosis.  What are the signs or symptoms?  Symptoms of facet syndrome may include:  · An ache in the neck or lower back. This may get worse when you twist or arch your back, or when you look up.  · Stiffness in the neck or lower back.  · Numbness, tingling, or weakness in the arms or legs.  Symptoms of cervical facet syndrome may include:  · Headache.  · Pain in the back of the head.  · Pain in the shoulder blades.  Symptoms of lumbar facet syndrome  may include pain in any of the following areas:  · Groin.  · Thighs.  · Lower back.  · Buttocks.  · Hips.  How is this diagnosed?  This condition may be diagnosed based on:  · Your symptoms.  · Your medical history.  · A physical exam.  · Imaging tests, such as:  ? X-rays.  ? MRI.  · A procedure in which medicines to numb the area (local anesthetic) and medicines to reduce inflammation (steroids) are injected into your affected joint (facet joint block). This helps to diagnose and may relieve pain.  How is this treated?  This condition may be treated by:  · Stopping or modifying activities that make your condition worse.  · Taking medicines that help reduce pain and inflammation.  · Having steroid injections to help reduce severe pain.  · Doing physical therapy.  · Following a weight-control plan.  · Having a procedure called radiofrequency ablation. This is a surgical procedure that uses high-frequency radio waves to block signals from affected nerves.  · Having surgery to stabilize your spine or to take pressure off your nerves. This is rare.  Follow these instructions at home:  Medicines  · Take over-the-counter and prescription medicines only as told by your health care provider.  · Ask your health care provider if the medicine prescribed to you:  ? Requires you to avoid driving or using heavy machinery.  ? Can cause constipation. You may need to take actions to prevent or treat constipation, such as:  § Drink enough fluid to keep your urine pale yellow.  § Take over-the-counter or prescription medicines.  § Eat foods that are high in fiber, such as beans, whole grains, and fresh fruits and vegetables.  § Limit foods that are high in fat and processed sugars, such as fried or sweet foods.  Activity  · Rest your neck and back as told by your health care provider.  · Return to your normal activities as told by your health care provider. Ask your health care provider what activities are safe for you.  · If physical  therapy was prescribed, do exercises as told by your health care provider.  General instructions    · Do not use any products that contain nicotine or tobacco, such as cigarettes, e-cigarettes, and chewing tobacco. These can delay healing. If you need help quitting, ask your health care provider.  · Use good posture throughout your daily activities. Good posture means that your spine is in its natural S-curve position (your spine is neutral), your shoulders are pulled back slightly, and your head is not forward.  · Maintain a healthy weight. Follow instructions from your health care provider for weight control. These may include dietary restrictions.  · Keep all follow-up visits as told by your health care provider. This is important.  Contact a health care provider if you have:  · Symptoms that get worse or do not improve in 2-4 weeks of treatment.  · New symptoms.  Get help right away if you:  · Have numbness or weakness in any part of your body.  · Lose control over your bladder or bowel functions.  Summary  · Facet syndrome is a condition in which joints (facet joints) that connect the bones of the spine (vertebrae) become damaged. This can cause pain and stiffness in the neck (cervical facet syndrome) or in the lower back (lumbar facet syndrome).  · Rest your neck and back as told by your health care provider.  · If physical therapy was prescribed, do exercises as told by your health care provider.  · Take over-the-counter and prescription medicines only as told by your health care provider.  · Contact a health care provider if you have symptoms that get worse or do not improve in 2-4 weeks of treatment, or if you have new symptoms.  This information is not intended to replace advice given to you by your health care provider. Make sure you discuss any questions you have with your health care provider.  Document Released: 12/18/2006 Document Revised: 11/26/2019 Document Reviewed: 12/01/2019  [employer] Patient  Education © 2020 [employer]    Medial Branch Nerve Block    Medial branch nerve block is a procedure to numb the nerves that supply the joints between your spinal bones (facet joints). The facet joints are located on the back of your spine. You may have the procedure on your neck or your upper, middle, or lower spine.  During this procedure, your health care provider will inject a numbing medicine (local anesthetic) around the medial nerves near the facet joint being treated. If more than one facet joint is causing pain, you may have more than one injection. In most cases, an anti-inflammatory medicine (steroid) will also be injected. You may need this procedure if:  · You have back pain from wear and tear (osteoarthritis) of your facet joint.  · You have an injury to a facet joint.  · Your health care provider wants to diagnose a facet joint as the cause of your pain. If the procedure relieves the pain, this indicates that the facet joint was the cause.  The local anesthetic will relieve pain for several days. The steroid may continue to relieve pain for several weeks. If your pain returns when the medicines wear off, this procedure may be repeated.  Tell a health care provider about:  · Any allergies you have.  · All medicines you are taking, including vitamins, herbs, eye drops, creams, and over-the-counter medicines.  · Any problems you or family members have had with anesthetic medicines.  · Any blood disorders you have.  · Any surgeries you have had.  · Any medical conditions you have.  · Whether you are pregnant or may be pregnant.  What are the risks?  Generally, this is a safe procedure. However, problems may occur, including:  · Infection.  · Bleeding.  · Allergic reactions to medicines or dyes.  · Damage to other structures or organs.  · Injection of the anesthetic into a blood vessel, which may decrease blood supply to your spinal cord and cause damage.  · Spread of the anesthetic to nearby nerves,  which may cause temporary weakness or numbness.  What happens before the procedure?  · Ask your health care provider about:  ? Changing or stopping your regular medicines. This is especially important if you are taking diabetes medicines or blood thinners.  ? Taking medicines such as aspirin and ibuprofen. These medicines can thin your blood. Do not take these medicines unless your health care provider tells you to take them.  ? Taking over-the-counter medicines, vitamins, herbs, and supplements.  · Plan to have someone take you home from the hospital or clinic.  · Follow instructions from your health care provider about any eating or drinking restrictions before the procedure.  · Ask your health care provider what steps will be taken to help prevent infection. These may include:  ? Removing hair at the injection site.  ? Washing skin with a germ-killing soap.  What happens during the procedure?  · An IV may be inserted into one of your veins.  · You will be given one or more of the following:  ? A medicine to help you relax (sedative).  ? A medicine to numb the area (local anesthetic). Your health care provider will feel for the facet joint or joints that are causing pain and inject a short-acting local anesthetic into the skin over the joint or joints.  · Your health care provider will then pass a needle into the area around the facet joint.  · Your health care provider may use a type of X-ray (fluoroscopy) to look at images of your spinal cord. If so, the health care provider will inject a small amount of dye into the facet joint area. The dye will show up on fluoroscopy and help locate the exact area to inject the long-acting anesthetic.  · The medicine will then be injected. Along with the long-acting anesthetic, a steroid medicine may also be injected.  · The needle will be removed, and a bandage will be placed over the injection site.  The procedure may vary among health care providers and hospitals.  What  can I expect after the procedure?  · Your blood pressure, heart rate, breathing rate, and blood oxygen level will be monitored until you leave the hospital or clinic.  · You should feel less pain in your back.  · You may have some soreness around the injection site.  Follow these instructions at home:  Injection site care  · Leave your bandage on for 24 hours.  · Do not take baths, swim, or use a hot tub until your health care provider approves.  · Check your injection site every day for signs of infection. Check for:  ? Redness, swelling, or pain.  ? Fluid or blood.  ? Warmth.  ? Pus or a bad smell.  · If directed, put ice on the injection area:  ? Put ice in a plastic bag.  ? Place a towel between your skin and the bag.  ? Leave the ice on for 20 minutes, 2-3 times a day.  General instructions  · Take over-the-counter and prescription medicines only as told by your health care provider.  · Do not drive for 24 hours if you were given a sedative during the procedure.  · Return to your normal activities as told by your health care provider. Ask your health care provider what activities are safe for you.  · Keep a log of your pain after the procedure. Keep track of how much pain you have and when you have it. This will help your health care provider plan your future treatment.  ? You should have relief of pain from the anesthetic for up to 3 days.  ? After that you may notice some pain again until the steroid starts to help. Pain relief from the steroid may last for a few weeks.  · Keep all follow-up visits as told by your health care provider. This is important.  Contact your health care provider if:  · Your pain is not relieved or gets worse at home.  · You have a fever or chills.  · You have any signs of infection.  · You develop any numbness or weakness.  Summary  · Medial branch nerve block is a procedure to numb the nerves that supply the joints between your spinal bones (facet joint).  · You may have the  procedure on your neck or your upper, middle, or lower spine.  · This procedure may be done both to diagnose and relieve facet joint pain.  · A long-acting local anesthetic is injected close to the nerve that supplies the facet joint. An anti-inflammatory medicine (steroid) will also be injected.  This information is not intended to replace advice given to you by your health care provider. Make sure you discuss any questions you have with your health care provider.  Document Released: 08/22/2019 Document Revised: 04/10/2020 Document Reviewed: 08/22/2019  [employer]vier Patient Education © 2020 [employer]

## 2020-06-24 ENCOUNTER — LAB REQUISITION (OUTPATIENT)
Dept: LAB | Facility: HOSPITAL | Age: 50
End: 2020-06-24

## 2020-06-24 DIAGNOSIS — Z00.00 ENCOUNTER FOR GENERAL ADULT MEDICAL EXAMINATION WITHOUT ABNORMAL FINDINGS: ICD-10-CM

## 2020-06-26 PROCEDURE — U0004 COV-19 TEST NON-CDC HGH THRU: HCPCS | Performed by: ANESTHESIOLOGY

## 2020-06-27 LAB
REF LAB TEST METHOD: NORMAL
SARS-COV-2 RNA RESP QL NAA+PROBE: NOT DETECTED

## 2020-06-29 ENCOUNTER — DOCUMENTATION (OUTPATIENT)
Dept: PAIN MEDICINE | Facility: CLINIC | Age: 50
End: 2020-06-29

## 2020-06-29 ENCOUNTER — OUTSIDE FACILITY SERVICE (OUTPATIENT)
Dept: PAIN MEDICINE | Facility: CLINIC | Age: 50
End: 2020-06-29

## 2020-06-29 PROCEDURE — 64493 INJ PARAVERT F JNT L/S 1 LEV: CPT | Performed by: ANESTHESIOLOGY

## 2020-06-29 PROCEDURE — 64494 INJ PARAVERT F JNT L/S 2 LEV: CPT | Performed by: ANESTHESIOLOGY

## 2020-06-29 PROCEDURE — 64495 INJ PARAVERT F JNT L/S 3 LEV: CPT | Performed by: ANESTHESIOLOGY

## 2020-06-29 NOTE — PROGRESS NOTES
Bilateral L2-5 Lumbar Medial Branch Blockade  [facility]    PREOPERATIVE DIAGNOSIS:  Lumbar spondylosis without myelopathy    POSTOPERATIVE DIAGNOSIS:  Lumbar spondylosis without myelopathy    PROCEDURE:   Diagnostic Bilateral Lumbar Medial Branch Nerve Blockades, with fluoroscopy:  L2, L3, L4, and L5 nerves (at the L3, L4, L5 transverse processes and the sacral alar groove) to block facet joints L3-4, L4-5, and L5-S1  1. 73646-49 -- Bilateral Lumbar Facet blocks, 1st Level  2. [id number] -- Bilateral Lumbar Facet blocks, 2nd  Level  3. 00618-93 -- Bilateral Lumbar Facet blocks, 3rd Level    PRE-PROCEDURE DISCUSSION WITH PATIENT:    Risks and complications were discussed with the patient prior to starting the procedure and informed consent was obtained.      SURGEON:  Katerina Abraham MD    REASON FOR PROCEDURE:   The patient complains of pain that seems to have a significant axial component, Tenderness of the affected facet joints on palpation, Increased back pain on range of motion exams, Pain on extension of the lumbar spine and Positive lumbar facet loading maneuver    SEDATION:  Versed 2mg IV  ANESTHETIC:  Marcaine 0.25%  STEROID:  NONE  TOTAL VOLUME OF SOLUTION: 8ml    DESCRIPTON OF PROCEDURE:  After obtaining informed consent, IV access was obtained in the preoperative area.   The patient was taken to the operating room.  The patient was placed in the prone position with a pillow under the abdomen. All pressure points were well padded.  EKG, blood pressure, and pulse oximeter were monitored.  The patient was monitored and sedated by the RN under my direction. The lumbosacral area was prepped with Chloraprep and draped in a sterile fashion. Under fluoroscopic guidance the transverse processes of the L3, L4, and L5 vertebrae at the junctions of the superior articular processes were identified on the right. Also identified was the groove between the ala and the superior articular process of the  sacrum on the ipsilateral side.  Skin and subcutaneous tissue were anesthetized with 1% lidocaine above each of these points. A 22-gauge spinal needle was introduced under fluoroscopic guidance at the above junctions. Aspiration was negative for blood and CSF.  After confirming the position of the needle with fluoroscope in all views,a total of 1 mL of the anesthetic solution noted above was injected at each of these points.  Needles were removed intact from each of the areas.  A similar procedure was repeated to block the L2, L3, L4, and L5 nerves on the contralateral side.   Onset of analgesia was noted.  Vital signs remained stable throughout.      ESTIMATED BLOOD LOSS:  <5 mL  SPECIMENS:  none    COMPLICATIONS:   No complications were noted.    TOLERANCE & DISCHARGE CONDITION:    The patient tolerated the procedure well.  The patient was transported to the recovery area without difficulties.  The patient was discharged to home under the care of family in stable and satisfactory condition.    PLAN OF CARE:  1. The patient was given our standard instruction sheet.  2. We discussed that Lumbar Medial Branch Blockade is a diagnostic procedure in consideration for radiofrequency ablation if two diagnostic procedures prove to be positive for significant benefit.  If sustained relief of 6 to eight weeks is obtained, then an alternative plan could be therapeutic lumbar branch blockades.  3. The patient is asked to keep a pain log each hour for 8 hours after the procedure today.  4. The patient will  Repeat injection 1 week.  5. The patient will resume all medications as per the medication reconciliation sheet.

## 2020-07-02 ENCOUNTER — TELEPHONE (OUTPATIENT)
Dept: PAIN MEDICINE | Facility: CLINIC | Age: 50
End: 2020-07-02

## 2020-07-02 NOTE — TELEPHONE ENCOUNTER
Pt called and left msg he would like to schedule the second set of Bilateral L2-5 Lumbar Medial Branch Blockade. Can you please call pt to schedule? Thank you.

## 2020-07-08 ENCOUNTER — LAB REQUISITION (OUTPATIENT)
Dept: LAB | Facility: HOSPITAL | Age: 50
End: 2020-07-08

## 2020-07-08 DIAGNOSIS — Z00.00 ENCOUNTER FOR GENERAL ADULT MEDICAL EXAMINATION WITHOUT ABNORMAL FINDINGS: ICD-10-CM

## 2020-07-10 PROCEDURE — U0004 COV-19 TEST NON-CDC HGH THRU: HCPCS | Performed by: ANESTHESIOLOGY

## 2020-07-11 LAB
REF LAB TEST METHOD: NORMAL
SARS-COV-2 RNA RESP QL NAA+PROBE: NOT DETECTED

## 2020-07-13 ENCOUNTER — DOCUMENTATION (OUTPATIENT)
Dept: PAIN MEDICINE | Facility: CLINIC | Age: 50
End: 2020-07-13

## 2020-07-13 ENCOUNTER — OUTSIDE FACILITY SERVICE (OUTPATIENT)
Dept: PAIN MEDICINE | Facility: CLINIC | Age: 50
End: 2020-07-13

## 2020-07-13 PROCEDURE — 64494 INJ PARAVERT F JNT L/S 2 LEV: CPT | Performed by: ANESTHESIOLOGY

## 2020-07-13 PROCEDURE — 64495 INJ PARAVERT F JNT L/S 3 LEV: CPT | Performed by: ANESTHESIOLOGY

## 2020-07-13 PROCEDURE — 64493 INJ PARAVERT F JNT L/S 1 LEV: CPT | Performed by: ANESTHESIOLOGY

## 2020-07-13 NOTE — PROGRESS NOTES
Bilateral L2-5 Lumbar Medial Branch Blockade  [facility]    PREOPERATIVE DIAGNOSIS:  Lumbar spondylosis without myelopathy    POSTOPERATIVE DIAGNOSIS:  Lumbar spondylosis without myelopathy    PROCEDURE:   Diagnostic Bilateral Lumbar Medial Branch Nerve Blockades, with fluoroscopy:  L2, L3, L4, and L5 nerves (at the L3, L4, L5 transverse processes and the sacral alar groove) to block facet joints L3-4, L4-5, and L5-S1  1. [id number] -- Bilateral Lumbar Facet blocks, 1st Level  2. [id number] -- Bilateral Lumbar Facet blocks, 2nd  Level  3. [id number] -- Bilateral Lumbar Facet blocks, 3rd Level    PRE-PROCEDURE DISCUSSION WITH PATIENT:    Risks and complications were discussed with the patient prior to starting the procedure and informed consent was obtained.      SURGEON:  Katerina Abraham MD    REASON FOR PROCEDURE:   The patient complains of pain that seems to have a significant axial component, Previous diagnostic positivity of a Lumbar Medial Branch Blockade at the same levels, The patient admits to 80% or more pain relief diagnostically from medial branch blockades. and Positive lumbar facet loading maneuver    SEDATION:  Versed 2mg IV  ANESTHETIC:  Marcaine 0.25%  STEROID:  NONE  TOTAL VOLUME OF SOLUTION: 8ml    DESCRIPTON OF PROCEDURE:  After obtaining informed consent, IV access was obtained in the preoperative area.   The patient was taken to the operating room.  The patient was placed in the prone position with a pillow under the abdomen. All pressure points were well padded.  EKG, blood pressure, and pulse oximeter were monitored.  The patient was monitored and sedated by the RN under my direction. The lumbosacral area was prepped with Chloraprep and draped in a sterile fashion. Under fluoroscopic guidance the transverse processes of the L3, L4, and L5 vertebrae at the junctions of the superior articular processes were identified on the right. Also identified was the groove between the ala and  the superior articular process of the sacrum on the ipsilateral side.  Skin and subcutaneous tissue were anesthetized with 1% lidocaine above each of these points. A 22-gauge spinal needle was introduced under fluoroscopic guidance at the above junctions. Aspiration was negative for blood and CSF.  After confirming the position of the needle with fluoroscope in all views, a total of 1 mL of the anesthetic solution noted above was injected at each of these points.  Needles were removed intact from each of the areas.  A similar procedure was repeated to block the L2, L3, L4, and L5 nerves on the contralateral side.   Onset of analgesia was noted.  Vital signs remained stable throughout.      ESTIMATED BLOOD LOSS:  <5 mL  SPECIMENS:  none    COMPLICATIONS:   No complications were noted.    TOLERANCE & DISCHARGE CONDITION:    The patient tolerated the procedure well.  The patient was transported to the recovery area without difficulties.  The patient was discharged to home under the care of family in stable and satisfactory condition.    PLAN OF CARE:  1. The patient was given our standard instruction sheet.  2. We discussed that Lumbar Medial Branch Blockade is a diagnostic procedure in consideration for radiofrequency ablation if two diagnostic procedures prove to be positive for significant benefit.  If sustained relief of 6 to eight weeks is obtained, then an alternative plan could be therapeutic lumbar branch blockades.  3. The patient is asked to keep a pain log each hour for 8 hours after the procedure today.  4. The patient will  Return to clinic 1 wk.  5. The patient will resume all medications as per the medication reconciliation sheet.         no

## 2020-07-14 NOTE — PROGRESS NOTES
TELEPHONE VISIT    You have chosen to receive care through a telephone visit. Do you consent to use a telephone visit for your medical care today? Yes    CHIEF COMPLAINT Back Pain      Subjective   Carlos Reese is a 49 y.o. male  who presents for follow-up.  He has a history of lumbar spondylosis and facet joint syndrome.  He underwent bilateral L3-S1 medial branch blocks on 6/29/2020 with 80% relief for a couple of hours and again on 7/2/2020 with 80% relief for couple of hours.     He continues to have 5/10 low back pain that is described as achy and sometimes sharp/stabbing.  The pain does radiate into the legs to the knees and his feet feel like they're asleep, however he attributes this to his diabetes.  Any movement irritates his back.         PEG Assessment   What number best describes your pain on average in the past week?8  What number best describes how, during the past week, pain has interfered with your enjoyment of life?7  What number best describes how, during the past week, pain has interfered with your general activity?  8      The following portions of the patient's history were reviewed and updated as appropriate: allergies, current medications, past family history, past medical history, past social history, past surgical history and problem list.    Review of Systems   Constitutional: Negative for chills and fever.   Respiratory: Negative for cough and shortness of breath.    Cardiovascular: Negative for chest pain.   Musculoskeletal: Positive for back pain and gait problem.       Vitals:    07/16/20 1400   PainSc:   5         Objective   Physical Exam   Constitutional: He is oriented to person, place, and time.   Neurological: He is alert and oriented to person, place, and time.   Psychiatric: He has a normal mood and affect. His behavior is normal. Thought content normal.   Vitals reviewed.        Assessment/Plan   Problems Addressed this Visit     None      Visit Diagnoses      Osteoarthritis of lumbar spine, unspecified spinal osteoarthritis complication status    -  Primary    Lumbar facet joint syndrome            - Will schedule bilateral L3-S1 radiofrequency ablation.  Risks discussed.     --- Follow-up for bilateral L3-S1 radiofrequency ablation.          EMMA REPORT    EMMA report has been reviewed and scanned into the patient's chart.    As the clinician, I personally reviewed the EMMA from 7/15/2020 .       Katerina Abarham MD  Pain Management       Our practice is offering alternative &/or electronic methods to continue to follow our patients while at the same time further the efforts toward social distancing, in accordance with our organizational policies, professional societies' guidance, and gubernatorial mandates.  I support the Healthy at Home campaign and in this visit I have counseled the patient on our needs to limit in-person office visits and physical encounters with medical facilities whenever possible.  I have also educated the patient on the medical necessities of maintaining social distancing while we continue to function during this crisis period.       The patient agreed to a Telephone Check-In.     TIME:  Total Time:  16 minutes.

## 2020-07-16 ENCOUNTER — OFFICE VISIT (OUTPATIENT)
Dept: PAIN MEDICINE | Facility: CLINIC | Age: 50
End: 2020-07-16

## 2020-07-16 DIAGNOSIS — M47.816 OSTEOARTHRITIS OF LUMBAR SPINE, UNSPECIFIED SPINAL OSTEOARTHRITIS COMPLICATION STATUS: Primary | ICD-10-CM

## 2020-07-16 DIAGNOSIS — M47.816 LUMBAR FACET JOINT SYNDROME: ICD-10-CM

## 2020-07-16 PROCEDURE — 99442 PR PHYS/QHP TELEPHONE EVALUATION 11-20 MIN: CPT | Performed by: ANESTHESIOLOGY

## 2020-07-16 NOTE — PATIENT INSTRUCTIONS
Radiofrequency Lesioning  Radiofrequency lesioning is a procedure that is performed to relieve pain. The procedure is often used for back, neck, or arm pain. Radiofrequency lesioning involves the use of a machine that creates radio waves to make heat. During the procedure, the heat is applied to the nerve that carries the pain signal. The heat damages the nerve and interferes with the pain signal. Pain relief usually starts about 2 weeks after the procedure and lasts for 6 months to 1 year.  You will be awake during the procedure. You will need to be able to talk with the health care provider during the procedure.  Tell a health care provider about:  · Any allergies you have.  · All medicines you are taking, including vitamins, herbs, eye drops, creams, and over-the-counter medicines.  · Any problems you or family members have had with anesthetic medicines.  · Any blood disorders you have.  · Any surgeries you have had.  · Any medical conditions you have or have had.  · Whether you are pregnant or may be pregnant.  What are the risks?  Generally, this is a safe procedure. However, problems may occur, including:  · Pain or soreness at the injection site.  · Allergic reaction to medicines given during the procedure.  · Bleeding.  · Infection at the injection site.  · Damage to nerves or blood vessels.  What happens before the procedure?  Staying hydrated  Follow instructions from your health care provider about hydration, which may include:  · Up to 2 hours before the procedure - you may continue to drink clear liquids, such as water, clear fruit juice, black coffee, and plain tea.  Eating and drinking  Follow instructions from your health care provider about eating and drinking, which may include:  · 8 hours before the procedure - stop eating heavy meals or foods, such as meat, fried foods, or fatty foods.  · 6 hours before the procedure - stop eating light meals or foods, such as toast or cereal.  · 6 hours before  the procedure - stop drinking milk or drinks that contain milk.  · 2 hours before the procedure - stop drinking clear liquids.  Medicines  Ask your health care provider about:  · Changing or stopping your regular medicines. This is especially important if you are taking diabetes medicines or blood thinners.  · Taking medicines such as aspirin and ibuprofen. These medicines can thin your blood. Do not take these medicines unless your health care provider tells you to take them.  · Taking over-the-counter medicines, vitamins, herbs, and supplements.  General instructions  · Plan to have someone take you home from the hospital or clinic.  · If you will be going home right after the procedure, plan to have someone with you for 24 hours.  · Ask your health care provider what steps will be taken to help prevent infection. These may include:  ? Removing hair at the procedure site.  ? Washing skin with a germ-killing soap.  ? Taking antibiotic medicine.  What happens during the procedure?    · An IV will be inserted into one of your veins.  · You will be given one or more of the following:  ? A medicine to help you relax (sedative).  ? A medicine to numb the area (local anesthetic).  · Your health care provider will insert a radiofrequency needle into the area to be treated. This is done with the help of a type of X-ray (fluoroscopy).  · A wire that carries the radio waves (electrode) will be put through the radiofrequency needle.  · An electrical pulse will be sent through the electrode to verify the correct nerve that is causing your pain. You will feel a tingling sensation, and you may have muscle twitching.  · The tissue around the needle tip will be heated by an electric current that comes from the radiofrequency machine. This will numb the nerves.  · The needle will be removed.  · A bandage (dressing) will be put on the insertion area.  The procedure may vary among health care providers and hospitals.  What happens  after the procedure?  · Your blood pressure, heart rate, breathing rate, and blood oxygen level will be monitored until you leave the hospital or clinic.  · Return to your normal activities as told by your health care provider. Ask your health care provider what activities are safe for you.  · Do not drive for 24 hours if you were given a sedative during your procedure.  Summary  · Radiofrequency lesioning is a procedure that is performed to relieve pain. The procedure is often used for back, neck, or arm pain.  · Radiofrequency lesioning involves the use of a machine that creates radio waves to make heat.  · Plan to have someone take you home from the hospital or clinic. Do not drive for 24 hours if you were given a sedative during your procedure.  · Return to your normal activities as told by your health care provider. Ask your health care provider what activities are safe for you.  This information is not intended to replace advice given to you by your health care provider. Make sure you discuss any questions you have with your health care provider.  Document Released: 08/15/2012 Document Revised: 09/05/2019 Document Reviewed: 09/05/2019  [employer] Patient Education © 2020 [employer]    Radiofrequency Lesioning, Care After  This sheet gives you information about how to care for yourself after your procedure. Your health care provider may also give you more specific instructions. If you have problems or questions, contact your health care provider.  What can I expect after the procedure?  After the procedure, it is common to have:  · Slight pain in the area where the procedure was done.  · Temporary numbness.  Follow these instructions at home:    Medicines  · Take over-the-counter and prescription medicines only as told by your health care provider.  · Do not drive for 24 hours if you were given a sedative during your procedure.  · Do not drive or use heavy machinery while taking prescription pain  medicine.  Insertion site care    · Remove the bandage (dressing) after 24 hours or as directed by your health care provider.  · Check your needle insertion site every day for signs of infection. Watch for:  ? Redness, swelling, or pain.  ? Fluid or blood.  ? Warmth.  ? Pus or a bad smell.  Managing pain    · If directed, put ice on the painful area.  ? Put ice in a plastic bag.  ? Place a towel between your skin and the bag.  ? Leave the ice on for 20 minutes, 2-3 times a day.  General instructions  · Return to your normal activities as told by your health care provider. Ask your health care provider what activities are safe for you.  · Pay close attention to how you feel after the procedure. If you start to have pain, write down when it hurts and how it feels. This will help you and your health care provider know if you need an additional treatment.  · Keep all follow-up visits as told by your health care provider. This is important.  Contact a health care provider if you have:  · Pain that does not get better.  · Redness, warmth, swelling, or pain at the needle insertion site.  · Fluid, blood, or pus coming from the needle insertion site.  · A fever.  Get help right away if you develop:  · Sudden, severe pain.  · Numbness or tingling near the insertion site and those symptoms do not go away.  Summary  · After the procedure, it is common to have slight pain and temporary numbness in the area where the procedure was done.  · Do not drive for 24 hours if you were given a sedative during your procedure.  · Pay close attention to how you feel after the procedure. If you start to have pain, write down when it hurts and how it feels. This will help you and your health care provider know if you need an additional treatment.  · Contact a health care provider if you have a fever or pain that does not get better, or if you notice redness, warmth, swelling, pain, fluid, blood, or pus at the insertion site.  · Get help right  away if you develop sudden, severe pain, or numbness or tingling near the insertion site.  This information is not intended to replace advice given to you by your health care provider. Make sure you discuss any questions you have with your health care provider.  Document Released: 08/16/2012 Document Revised: 09/05/2019 Document Reviewed: 09/05/2019  [employer] Patient Education © 2020 [employer]

## 2020-08-05 ENCOUNTER — LAB REQUISITION (OUTPATIENT)
Dept: LAB | Facility: HOSPITAL | Age: 50
End: 2020-08-05

## 2020-08-05 DIAGNOSIS — Z00.00 ENCOUNTER FOR GENERAL ADULT MEDICAL EXAMINATION WITHOUT ABNORMAL FINDINGS: ICD-10-CM

## 2020-08-07 PROCEDURE — U0004 COV-19 TEST NON-CDC HGH THRU: HCPCS | Performed by: ANESTHESIOLOGY

## 2020-08-08 LAB
REF LAB TEST METHOD: NORMAL
SARS-COV-2 RNA RESP QL NAA+PROBE: NOT DETECTED

## 2020-08-10 ENCOUNTER — DOCUMENTATION (OUTPATIENT)
Dept: PAIN MEDICINE | Facility: CLINIC | Age: 50
End: 2020-08-10

## 2020-08-10 ENCOUNTER — OUTSIDE FACILITY SERVICE (OUTPATIENT)
Dept: PAIN MEDICINE | Facility: CLINIC | Age: 50
End: 2020-08-10

## 2020-08-10 PROCEDURE — 64635 DESTROY LUMB/SAC FACET JNT: CPT | Performed by: ANESTHESIOLOGY

## 2020-08-10 PROCEDURE — 64636 DESTROY L/S FACET JNT ADDL: CPT | Performed by: ANESTHESIOLOGY

## 2020-08-10 NOTE — PROGRESS NOTES
Bilateral L2-5 Lumbar Medial Branch RADIOFREQUENCY  [facility]      PREOPERATIVE DIAGNOSIS:  Lumbar spondylosis without myelopathy    POSTOPERATIVE DIAGNOSIS:  Lumbar spondylosis without myelopathy    PROCEDURE:   Diagnostic Bilateral Lumbar Medial Branch Nerve thermal radiofrequency lesioning, with fluoroscopy:  L2, L3, L4, and L5 nerves (at the L3, L4, L5 transverse processes and the sacral alar groove) to thermally treat the innervation to facet joints L3-4, L4-5, and L5-S1  1. 40805-32 -- Bilateral L/S facet neuro destr., 1st Level  2. 07250-63 -- Bilateral L/S facet neuro destr., 2nd  Level  3. 52068-27 -- Bilateral  L/S facet neuro destr., 3rd Level    PRE-PROCEDURE DISCUSSION WITH PATIENT:    Risks and complications were discussed with the patient prior to starting the procedure and informed consent was obtained.      SURGEON:  Katerina Abraham MD    REASON FOR PROCEDURE:    The patient complains of pain that seems to have a significant axial component. Previous diagnostic positivity of MULTIPLE diagnostic injections to the same area. The patient admits to 80% or more pain relief diagnostically from injections. Pain on extension of the lumbar spine. Positive lumbar facet loading maneuver.    SEDATION:  Versed 2mg and Fentanyl 100mcg  TIME OF PROCEDURE:   The intraoperative procedure time after administration of the sedative was 23 minutes.       ANESTHETIC:  Bupivacaine 0.25% and 2% lidocaine  STEROID:  NONE      DESCRIPTON OF PROCEDURE:  After obtaining informed consent, IV access was obtained in the preoperative area.   The patient was taken to the operating room.  The patient was placed in the prone position with a pillow under the abdomen. All pressure points were well padded.  EKG, blood pressure, and pulse oximeter were monitored.  The patient was monitored and sedated by the RN under my direction. The lumbosacral area was prepped with Chloraprep and draped in a sterile fashion.      Under fluoroscopic guidance the transverse processes of the L3, L4, and L5 vertebrae at the junctions of the superior articular processes were identified on the right.  Also identified was the groove between the ala and the superior articular process of the sacrum on the ipsilateral side.  Skin and subcutaneous tissue were anesthetized with 1ml of 1% lidocaine above each of these points. Then, radiofrequency probe needles were advanced in this fluoro view to the above junctions.  Aspiration was negative for blood and CSF.  After confirming the position of the needle with fluoroscope in all views, testing was initiated.  First, sensory testing was started on each needle a 1V and 50Hz and slowly decreased until painful pressure stimulation diminished at 0.5V.  Next, motor testing was confirmed to be negative at 3V and 2Hz for any radicular stimulation.  Then 1mL of the local anesthetic was instilled in each needle.  Pulsed radiofrequency ablation was performed at 37 degrees Celsius for 90 seconds and lateral fluoroscopic view was confirmed again to ensure the needles had not advanced nor retracted.  Then, Radiofrequency Lesioning was initiated for 60 seconds at 80 degrees Celsius.  The needles were rotated 90 degrees and  lateral fluoroscopic view was confirmed again to ensure the needles had not advanced nor retracted.  Then, Radiofrequency Lesioning was initiated for 60 seconds at 80 degrees Celsius.  Needles were removed intact from each of the areas.     A similar procedure was repeated to address the L2, L3, L4, and L5 nerves on the contralateral side.   Onset of analgesia was noted.  Vital signs remained stable throughout.      ESTIMATED BLOOD LOSS:  <5 mL  SPECIMENS:  none    COMPLICATIONS:   No complications were noted.    TOLERANCE & DISCHARGE CONDITION:    The patient tolerated the procedure well.  The patient was transported to the recovery area without difficulties.  The patient was discharged to home  under the care of family in stable and satisfactory condition.    PLAN OF CARE:  1. The patient was given our standard instruction sheet.  2. The patient will  Return to clinic 6 wks.  3. The patient will resume all medications as per the medication reconciliation sheet.

## 2020-08-11 DIAGNOSIS — E11.8 TYPE 2 DIABETES MELLITUS WITH COMPLICATION, WITHOUT LONG-TERM CURRENT USE OF INSULIN (HCC): Primary | ICD-10-CM

## 2020-08-11 DIAGNOSIS — E78.49 OTHER HYPERLIPIDEMIA: ICD-10-CM

## 2020-08-18 LAB
ALBUMIN SERPL-MCNC: 4.7 G/DL (ref 3.8–4.9)
ALBUMIN/GLOB SERPL: 2.1 {RATIO} (ref 1.2–2.2)
ALP SERPL-CCNC: 53 IU/L (ref 39–117)
ALT SERPL-CCNC: 58 IU/L (ref 0–44)
AST SERPL-CCNC: 28 IU/L (ref 0–40)
BILIRUB SERPL-MCNC: 0.4 MG/DL (ref 0–1.2)
BUN SERPL-MCNC: 12 MG/DL (ref 6–24)
BUN/CREAT SERPL: 15 (ref 9–20)
CALCIUM SERPL-MCNC: 9.9 MG/DL (ref 8.7–10.2)
CHLORIDE SERPL-SCNC: 99 MMOL/L (ref 96–106)
CHOLEST SERPL-MCNC: 178 MG/DL (ref 100–199)
CO2 SERPL-SCNC: 23 MMOL/L (ref 20–29)
CREAT SERPL-MCNC: 0.81 MG/DL (ref 0.76–1.27)
ERYTHROCYTE [DISTWIDTH] IN BLOOD BY AUTOMATED COUNT: 12.9 % (ref 11.6–15.4)
GLOBULIN SER CALC-MCNC: 2.2 G/DL (ref 1.5–4.5)
GLUCOSE SERPL-MCNC: 261 MG/DL (ref 65–99)
HBA1C MFR BLD: 9.2 % (ref 4.8–5.6)
HCT VFR BLD AUTO: 47 % (ref 37.5–51)
HDLC SERPL-MCNC: 31 MG/DL
HGB BLD-MCNC: 15.4 G/DL (ref 13–17.7)
LDLC SERPL CALC-MCNC: 79 MG/DL (ref 0–99)
MCH RBC QN AUTO: 29.9 PG (ref 26.6–33)
MCHC RBC AUTO-ENTMCNC: 32.8 G/DL (ref 31.5–35.7)
MCV RBC AUTO: 91 FL (ref 79–97)
PLATELET # BLD AUTO: 216 X10E3/UL (ref 150–450)
POTASSIUM SERPL-SCNC: 4.5 MMOL/L (ref 3.5–5.2)
PROT SERPL-MCNC: 6.9 G/DL (ref 6–8.5)
RBC # BLD AUTO: 5.15 X10E6/UL (ref 4.14–5.8)
SODIUM SERPL-SCNC: 138 MMOL/L (ref 134–144)
TRIGL SERPL-MCNC: 339 MG/DL (ref 0–149)
VLDLC SERPL CALC-MCNC: 68 MG/DL (ref 5–40)
WBC # BLD AUTO: 8.4 X10E3/UL (ref 3.4–10.8)

## 2020-08-20 ENCOUNTER — OFFICE VISIT (OUTPATIENT)
Dept: FAMILY MEDICINE CLINIC | Facility: CLINIC | Age: 50
End: 2020-08-20

## 2020-08-20 VITALS
HEART RATE: 72 BPM | HEIGHT: 67 IN | OXYGEN SATURATION: 98 % | BODY MASS INDEX: 36.63 KG/M2 | DIASTOLIC BLOOD PRESSURE: 92 MMHG | WEIGHT: 233.4 LBS | SYSTOLIC BLOOD PRESSURE: 142 MMHG | RESPIRATION RATE: 16 BRPM | TEMPERATURE: 97 F

## 2020-08-20 DIAGNOSIS — E78.49 OTHER HYPERLIPIDEMIA: ICD-10-CM

## 2020-08-20 DIAGNOSIS — E11.8 TYPE 2 DIABETES MELLITUS WITH COMPLICATION, WITHOUT LONG-TERM CURRENT USE OF INSULIN (HCC): Primary | ICD-10-CM

## 2020-08-20 PROCEDURE — 99214 OFFICE O/P EST MOD 30 MIN: CPT | Performed by: FAMILY MEDICINE

## 2020-08-20 NOTE — PATIENT INSTRUCTIONS

## 2020-08-20 NOTE — PROGRESS NOTES
Subjective   Carlos Reese is a 49 y.o. male. Presents for diabetes and hyperlipidemia visit.      History of Present Illness     Diabetes-uncontrolled.  his A1c is 9.2 and his sugars are in the mid to high 200s.  He is taking the metformin and alogliptin as prescribed.  He was not able to get the Januvia due to insurance.  No new numbness of tingling.    Hyperlipidemia- improving.  He is on no medication for that currently and trying to eat better.    The following portions of the patient's history were reviewed and updated as appropriate: allergies, current medications, past family history, past medical history, past social history, past surgical history and problem list.    Review of Systems   Constitutional: Negative for fatigue and fever.   Respiratory: Negative for shortness of breath and wheezing.    Cardiovascular: Negative for chest pain and palpitations.   Gastrointestinal: Negative for constipation and nausea.       Objective   Physical Exam   Constitutional: No distress.   obese   Cardiovascular: Normal rate, regular rhythm and normal heart sounds. Exam reveals no gallop and no friction rub.   No murmur heard.  Pulmonary/Chest: Effort normal and breath sounds normal. He has no wheezes. He has no rales.   Skin: Skin is warm. Capillary refill takes less than 2 seconds.   Nursing note and vitals reviewed.  I have reviewed the HPI, review of systems, and physical exam and they are all accurate with the patient and for this encounter.        Assessment/Plan   Carlos was seen today for med management.    Diagnoses and all orders for this visit:    Type 2 diabetes mellitus with complication, without long-term current use of insulin (CMS/[facility])  -     dapagliflozin (Farxiga) 5 MG tablet tablet; Take 1 tablet by mouth Daily.  -     Hemoglobin A1c; Future    Other hyperlipidemia      Plan as above.  I will try to get the Farxiga paid for.  If I cannot, I will order him a substitute.  His hyperlipidemia seems to  be turning around with his diet changes so I am going to wait and see how he does on his own.  See back in 3 months for repeat A1c check.    We did brief counseling regarding the patient's BMI > 30.  We discussed healthy ways to eat, movement/exercise plan, and watching weight at home.  We will continue to visit this in the future.

## 2020-10-06 ENCOUNTER — TELEMEDICINE (OUTPATIENT)
Dept: PAIN MEDICINE | Facility: CLINIC | Age: 50
End: 2020-10-06

## 2020-10-06 DIAGNOSIS — M54.41 CHRONIC BILATERAL LOW BACK PAIN WITH BILATERAL SCIATICA: Primary | ICD-10-CM

## 2020-10-06 DIAGNOSIS — M54.42 CHRONIC BILATERAL LOW BACK PAIN WITH BILATERAL SCIATICA: Primary | ICD-10-CM

## 2020-10-06 DIAGNOSIS — G89.29 CHRONIC BILATERAL LOW BACK PAIN WITH BILATERAL SCIATICA: Primary | ICD-10-CM

## 2020-10-06 DIAGNOSIS — M51.36 DDD (DEGENERATIVE DISC DISEASE), LUMBAR: ICD-10-CM

## 2020-10-06 PROCEDURE — 99214 OFFICE O/P EST MOD 30 MIN: CPT | Performed by: NURSE PRACTITIONER

## 2020-10-06 NOTE — PROGRESS NOTES
TELEMEDICINE - VIDEO VISIT  You have chosen to receive care through a telehealth visit.  Do you consent to use a video/audio connection for your medical care today? Yes    CHIEF COMPLAINT  Back pain    Subjective   Carlos Reese is a 49 y.o. male  who presents for a video visit follow-up.He has a history of back pain.    Patient completed bilateral L2-L5 radiofrequency ablation on 8/10/2020 with Dr. Abraham.  He reports pain relief for 2-3 weeks, after that time his pain has returned to baseline.      Patient has had chronic back pain for the past 30 years.  Pain is located in his back and radiates to bilateral posterior legs.  He has not had any type of back surgery.  He has been through physical therapy most recent being within the last couple of months.  He reports that this was somewhat helpful.  He is also seen a chiropractor benefit was transient with this.    Has had several lumbar EVELYN's in the past. Most recent would about been 5-6 years ago. He reports little benefit with these.      He reports that he saw a surgeon (Dr. Dallas) about 12 years ago.  Recommended a back brace.      Back Pain  This is a chronic problem. The current episode started more than 1 year ago. The problem occurs daily. The problem has been waxing and waning since onset. The pain is present in the lumbar spine. The quality of the pain is described as aching and burning. Radiates to: bilateral posterior thighs. The pain is at a severity of 8/10. The symptoms are aggravated by position, standing, bending and twisting. He has tried analgesics, NSAIDs, muscle relaxant, chiropractic manipulation, home exercises, heat and ice for the symptoms. The treatment provided mild relief.      The following portions of the patient's history were reviewed and updated as appropriate: allergies, current medications, past family history, past medical history, past social history, past surgical history and problem list.    Review of Systems    Musculoskeletal: Positive for back pain.     Vitals:    10/06/20 1104   PainSc:   8   PainLoc: Back     Objective   Physical Exam  Constitutional:       General: He is not in acute distress.     Appearance: Normal appearance.   Pulmonary:      Effort: No respiratory distress.   Neurological:      Mental Status: He is alert and oriented to person, place, and time.   Psychiatric:         Mood and Affect: Mood normal.         Behavior: Behavior normal.       Assessment/Plan   Diagnoses and all orders for this visit:    Chronic bilateral low back pain with bilateral sciatica  -     Ambulatory Referral to Neurosurgery  -     Ambulatory Referral to Physical Therapy Evaluate and treat    DDD (degenerative disc disease), lumbar  -     Ambulatory Referral to Neurosurgery  -     Ambulatory Referral to Physical Therapy Evaluate and treat    ----------------  Our practice is offering alternative &/or electronic methods to continue to follow our patients while at the same time further the efforts toward social distancing, in accordance with our organizational policies, professional societies' guidance, and gubernatorial mandates.  I support the Healthy at Home campaign and in this visit I have counseled the patient on our needs to limit in-person office visits and physical encounters with medical facilities whenever possible.  I have also educated the patient on the medical necessities of maintaining social distancing while we continue to function during this crisis period.      The patient was counseled on the need to consider telehealth options. The patient was offered the opportunity for a Video Visit using [employer]. The patient agreed to a Video Visit. The patient was counseled on the need for a check-in visit. The patient was educated about our efforts to comply with monitoring standards when prescribing potent medications.    TIME:  Total Time:  25 minutes. Topics discussed are outlined in the Assessment/Plan section of  the note.    ----------------  --- Resume PT   --- Refer to neurosurgery to evaluate  --- Not interested in narcotic pain medication   --- Poor candidate for NSAID's due to GI issues and poorly controlled diabetes. I offered him a trial of Gabapentin or a muscle relaxant. He declines today.  Really not interested in medication management at this time.    --- Follow-up after evaluation with neurosurgery       EMMA REPORT  EMMA report has been reviewed and scanned into the patient's chart.    As the clinician, I personally reviewed the EMMA from 10/6/2020 while the patient was in the office today.  -------  EMR Dragon/Transcription disclaimer:   Much of this encounter note is an electronic transcription/translation of spoken language to printed text. The electronic translation of spoken language may permit erroneous, or at times, nonsensical words or phrases to be inadvertently transcribed; Although I have reviewed the note for such errors, some may still exist.      25 minute visit. Greater than 50% spent in education/counseling.  Initial visit with JENNA Haider.

## 2020-11-03 ENCOUNTER — OFFICE VISIT (OUTPATIENT)
Dept: NEUROSURGERY | Facility: CLINIC | Age: 50
End: 2020-11-03

## 2020-11-03 VITALS
HEIGHT: 67 IN | BODY MASS INDEX: 36.57 KG/M2 | TEMPERATURE: 97.5 F | DIASTOLIC BLOOD PRESSURE: 91 MMHG | HEART RATE: 72 BPM | SYSTOLIC BLOOD PRESSURE: 147 MMHG | WEIGHT: 233 LBS

## 2020-11-03 DIAGNOSIS — M54.41 CHRONIC BILATERAL LOW BACK PAIN WITH BILATERAL SCIATICA: Primary | ICD-10-CM

## 2020-11-03 DIAGNOSIS — G89.29 CHRONIC BILATERAL LOW BACK PAIN WITH BILATERAL SCIATICA: Primary | ICD-10-CM

## 2020-11-03 DIAGNOSIS — M54.42 CHRONIC BILATERAL LOW BACK PAIN WITH BILATERAL SCIATICA: Primary | ICD-10-CM

## 2020-11-03 DIAGNOSIS — M51.36 DDD (DEGENERATIVE DISC DISEASE), LUMBAR: ICD-10-CM

## 2020-11-03 PROCEDURE — 99244 OFF/OP CNSLTJ NEW/EST MOD 40: CPT | Performed by: NEUROLOGICAL SURGERY

## 2020-11-07 DIAGNOSIS — E11.8 TYPE 2 DIABETES MELLITUS WITH COMPLICATION, WITHOUT LONG-TERM CURRENT USE OF INSULIN (HCC): ICD-10-CM

## 2020-11-09 RX ORDER — DAPAGLIFLOZIN 5 MG/1
TABLET, FILM COATED ORAL
Qty: 30 TABLET | Refills: 0 | Status: SHIPPED | OUTPATIENT
Start: 2020-11-09 | End: 2020-12-07

## 2020-11-23 ENCOUNTER — RESULTS ENCOUNTER (OUTPATIENT)
Dept: FAMILY MEDICINE CLINIC | Facility: CLINIC | Age: 50
End: 2020-11-23

## 2020-11-23 DIAGNOSIS — E11.8 TYPE 2 DIABETES MELLITUS WITH COMPLICATION, WITHOUT LONG-TERM CURRENT USE OF INSULIN (HCC): ICD-10-CM

## 2020-12-07 DIAGNOSIS — E11.8 TYPE 2 DIABETES MELLITUS WITH COMPLICATION, WITHOUT LONG-TERM CURRENT USE OF INSULIN (HCC): ICD-10-CM

## 2020-12-07 RX ORDER — DAPAGLIFLOZIN 5 MG/1
TABLET, FILM COATED ORAL
Qty: 30 TABLET | Refills: 5 | Status: SHIPPED | OUTPATIENT
Start: 2020-12-07 | End: 2020-12-19

## 2020-12-07 NOTE — TELEPHONE ENCOUNTER
Patient has decided to switch to [facility] to stick with Dr. Rodriguez. He is calling to schedule appointment.

## 2020-12-17 ENCOUNTER — TELEPHONE (OUTPATIENT)
Dept: GASTROENTEROLOGY | Facility: CLINIC | Age: 50
End: 2020-12-17

## 2020-12-17 ENCOUNTER — OFFICE VISIT (OUTPATIENT)
Dept: INTERNAL MEDICINE | Facility: CLINIC | Age: 50
End: 2020-12-17

## 2020-12-17 VITALS
DIASTOLIC BLOOD PRESSURE: 82 MMHG | WEIGHT: 236 LBS | SYSTOLIC BLOOD PRESSURE: 144 MMHG | OXYGEN SATURATION: 99 % | BODY MASS INDEX: 37.04 KG/M2 | TEMPERATURE: 97.4 F | HEIGHT: 67 IN | HEART RATE: 89 BPM

## 2020-12-17 DIAGNOSIS — E11.9 ENCOUNTER FOR DIABETIC FOOT EXAM (HCC): ICD-10-CM

## 2020-12-17 DIAGNOSIS — R07.89 OTHER CHEST PAIN: Primary | ICD-10-CM

## 2020-12-17 DIAGNOSIS — J45.20 MILD INTERMITTENT ASTHMA, UNSPECIFIED WHETHER COMPLICATED: ICD-10-CM

## 2020-12-17 DIAGNOSIS — Z12.11 SCREEN FOR COLON CANCER: ICD-10-CM

## 2020-12-17 DIAGNOSIS — Z11.59 ENCOUNTER FOR HEPATITIS C SCREENING TEST FOR LOW RISK PATIENT: ICD-10-CM

## 2020-12-17 DIAGNOSIS — E11.8 TYPE 2 DIABETES MELLITUS WITH COMPLICATION, WITHOUT LONG-TERM CURRENT USE OF INSULIN (HCC): ICD-10-CM

## 2020-12-17 PROCEDURE — 99214 OFFICE O/P EST MOD 30 MIN: CPT | Performed by: FAMILY MEDICINE

## 2020-12-17 RX ORDER — ALBUTEROL SULFATE 90 UG/1
2 AEROSOL, METERED RESPIRATORY (INHALATION) EVERY 4 HOURS PRN
Qty: 18 G | Refills: 5 | Status: ON HOLD | OUTPATIENT
Start: 2020-12-17 | End: 2023-02-15 | Stop reason: SDUPTHER

## 2020-12-17 NOTE — PROGRESS NOTES
Subjective   Carlos Reese is a 49 y.o. male.  ER follow-up chest pain 11/19/2020, also diabetes    History of Present Illness     He was tested for left arm and chest pain on November 19 2020 and was found to have no cardiac abnormalities and was sent home from [facility].  [facility] stress test completed 12/8/20. He is having sweating episodes.  I reviewed the records from the ER visit that is located in the media tab.  They came to the conclusion that he was having atypical chest pain.  He says he was referred for the stress test but was never referred to a cardiologist that he is aware of.    Diabetes mellitus-  Poorly controlled overall.  .  No new numbness, tingling.  Patient is taking farxiga, aloglitin, metformin 1000mg BID as prescribed.  No side effects.  Last A1c was 3 months ago and was 9.2.  Not checking blood sugars regularly.    The following portions of the patient's history were reviewed and updated as appropriate: allergies, current medications, past family history, past medical history, past social history, past surgical history and problem list.    Review of Systems   Constitutional: Negative for activity change, appetite change, fatigue and fever.   HENT: Negative for ear pain, facial swelling and sore throat.    Eyes: Negative for discharge and itching.   Respiratory: Negative for cough, chest tightness and shortness of breath.    Cardiovascular: Negative for chest pain and palpitations.   Gastrointestinal: Negative for abdominal distention and constipation.   Endocrine: Negative for polydipsia, polyphagia and polyuria.   Genitourinary: Negative for difficulty urinating and flank pain.   Musculoskeletal: Negative for arthralgias and back pain.   Skin: Negative for color change, rash and wound.   Allergic/Immunologic: Negative for environmental allergies and food allergies.   Neurological: Negative for weakness and numbness.   Hematological: Negative for adenopathy. Does not bruise/bleed easily.    Psychiatric/Behavioral: Negative for decreased concentration and dysphoric mood. The patient is not nervous/anxious.        Objective   Physical Exam  Vitals signs and nursing note reviewed.   Constitutional:       General: He is not in acute distress.     Appearance: Normal appearance. He is well-developed. He is not diaphoretic.   Cardiovascular:      Rate and Rhythm: Normal rate and regular rhythm.      Heart sounds: Normal heart sounds. No murmur. No friction rub. No gallop.    Pulmonary:      Effort: Pulmonary effort is normal.      Breath sounds: Normal breath sounds. No wheezing or rales.   Feet:      Left foot:      Skin integrity: Left foot blister: .mono.      Comments: Diabetic foot exam:   Left: Filament test present   Pulses Dorsalis Pedis:  present  Posterior Tibial:  present   Reflexes 2+    Vibratory sensation normal   Proprioception normal   Sharp/dull discrimination normal       Right: Filament test present   Pulses Dorsalis Pedis:  present  Posterior Tibial:  present   Reflexes 2+    Vibratory sensation normal   Proprioception normal   Sharp/dull discrimination normal  Sensory exam of the foot is normal, tested with the monofilament. Good pulses, no lesions or ulcers, good peripheral pulses.      Skin:     General: Skin is warm.      Capillary Refill: Capillary refill takes less than 2 seconds.   Neurological:      Mental Status: He is alert.   Psychiatric:         Mood and Affect: Mood normal.         Behavior: Behavior normal.         Assessment/Plan   Diagnoses and all orders for this visit:    1. Other chest pain (Primary)  -     Ambulatory Referral to Cardiology    2. Type 2 diabetes mellitus with complication, without long-term current use of insulin (CMS/[facility])  -     Comprehensive Metabolic Panel  -     Hemoglobin A1c  -     Microalbumin / Creatinine Urine Ratio - Urine, Clean Catch    3. Mild intermittent asthma, unspecified whether complicated  -     albuterol sulfate  (90 Base)  MCG/ACT inhaler; Inhale 2 puffs Every 4 (Four) Hours As Needed for Wheezing.  Dispense: 18 g; Refill: 5    4. Screen for colon cancer  -     Ambulatory Referral For Screening Colonoscopy    5. Encounter for diabetic foot exam (CMS/HCC)    6. Encounter for hepatitis C screening test for low risk patient  -     Hepatitis C Antibody      I put in an urgent referral to cardiology given that he is having diaphoresis when he exerts himself much at all at this point.  When he was walking around the office I saw him get a little diaphoretic.  I told him to take it easy as much as possible at home and I will get a urgent referral placed.  I am asking also for the records for the stress test for review.  I may get some blood work from him today regarding the diabetes and also get his colonoscopy ordered.  He needed a refill of his albuterol but we did not go into that detail today but his asthma appears to be stable from what he is telling me.  I gave him warning signs to go to the ER such as continuing diaphoresis, chest pain that is not going away, shortness of breath, etc.

## 2020-12-17 NOTE — PATIENT INSTRUCTIONS
Angina    Angina is extreme discomfort in the chest, neck, arm, jaw, or back. The discomfort is caused by a lack of blood in the middle layer of the heart wall (myocardium).  There are four types of angina:  · Stable angina. This is triggered by vigorous activity or exercise. It goes away when you rest or take angina medicine.  · Unstable angina. This is a warning sign and can lead to a heart attack (acute coronary syndrome). This is a medical emergency. Symptoms come at rest and last a long time.  · Microvascular angina. This affects the small coronary arteries. Symptoms include feeling tired and being short of breath.  · Prinzmetal or variant angina. This is caused by a tightening (spasm) of the arteries that go to your heart.  What are the causes?  This condition is caused by atherosclerosis. This is the buildup of fat and cholesterol (plaque) in your arteries. The plaque may narrow or block the artery.  Other causes of angina include:  · Sudden tightening of the muscles of the arteries in the heart (coronary spasm).  · Small artery disease (microvascular dysfunction).  · Problems with any of your heart valves (heart valve disease).  · A tear in an artery in your heart (coronary artery dissection).  · Diseases of the heart muscle (cardiomyopathy), or other heart diseases.  What increases the risk?  You are more likely to develop this condition if you have:  · High cholesterol.  · High blood pressure (hypertension).  · Diabetes.  · A family history of heart disease.  · An inactive (sedentary) lifestyle, or you do not exercise enough.  · Depression.  · Had radiation treatment to the left side of your chest.  Other risk factors include:  · Using tobacco.  · Being obese.  · Eating a diet high in saturated fats.  · Being exposed to high stress or triggers of stress.  · Using drugs, such as cocaine.  Women have a greater risk for angina if:  · They are older than 55.  · They have gone through menopause (are  postmenopausal).  What are the signs or symptoms?  Common symptoms of this condition in both men and women may include:  · Chest pain, which may:  ? Feel like a crushing or squeezing in the chest, or like a tightness, pressure, fullness, or heaviness in the chest.  ? Last for more than a few minutes at a time, or it may stop and come back (recur) over the course of a few minutes.  · Pain in the neck, arm, jaw, or back.  · Unexplained heartburn or indigestion.  · Shortness of breath.  · Nausea.  · Sudden cold sweats.  Women and people with diabetes may have unusual (atypical) symptoms, such as:  · Fatigue.  · Unexplained feelings of nervousness or anxiety.  · Unexplained weakness.  · Dizziness or fainting.  How is this diagnosed?  This condition may be diagnosed based on:  · Your symptoms and medical history.  · Electrocardiogram (ECG) to measure the electrical activity in your heart.  · Blood tests.  · Stress test to look for signs of blockage when your heart is stressed.  · CT angiogram to examine your heart and the blood flow to it.  · Coronary angiogram to check your coronary arteries for blockage.  How is this treated?  Angina may be treated with:  · Medicines to:  ? Prevent blood clots and heart attack.  ? Relax blood vessels and improve blood flow to the heart (nitrates).  ? Reduce blood pressure, improve the pumping action of the heart, and relax blood vessels that are spasming.  ? Reduce cholesterol and help treat atherosclerosis.  · A procedure to widen a narrowed or blocked coronary artery (angioplasty). A mesh tube may be placed in a coronary artery to keep it open (coronary stenting).  · Surgery to allow blood to go around a blocked artery (coronary artery bypass surgery).  Follow these instructions at home:  Medicines  · Take over-the-counter and prescription medicines only as told by your health care provider.  · Do not take the following medicines unless your health care provider approves:  ? NSAIDs,  such as ibuprofen or naproxen.  ? Vitamin supplements that contain vitamin A, vitamin E, or both.  ? Hormone replacement therapy that contains estrogen with or without progestin.  Eating and drinking    · Eat a heart-healthy diet. This includes plenty of fresh fruits and vegetables, whole grains, low-fat (lean) protein, and low-fat dairy products.  · Follow instructions from your health care provider about eating or drinking restrictions.  Activity  · Follow an exercise program approved by your health care provider.  · Consider joining a cardiac rehabilitation program.  · Take a break when you feel fatigued. Plan rest periods in your daily activities.  Lifestyle    · Do not use any products that contain nicotine or tobacco, such as cigarettes, e-cigarettes, and chewing tobacco. If you need help quitting, ask your health care provider.  · If your health care provider says you can drink alcohol:  ? Limit how much you use to:  § 0-1 drink a day for nonpregnant women.  § 0-2 drinks a day for men.  ? Be aware of how much alcohol is in your drink. In the [place] one drink equals one 12 oz bottle of beer (355 mL), one 5 oz glass of wine (148 mL), or one 1½ oz glass of hard liquor (44 mL).  General instructions  · Maintain a healthy weight.  · Learn to manage stress.  · Keep your vaccinations up to date. Get the flu (influenza) vaccine every year.  · Talk to your health care provider if you feel depressed. Take a depression screening test to see if you are at risk for depression.  · Work with your health care provider to manage other health conditions, such as hypertension or diabetes.  · Keep all follow-up visits as told by your health care provider. This is important.  Get help right away if:  · You have pain in your chest, neck, arm, jaw, or back, and the pain:  ? Lasts more than a few minutes.  ? Is recurring.  ? Is not relieved by taking medicines under the tongue (sublingual nitroglycerin).  ? Increases in intensity or  frequency.  · You have a lot of sweating without cause.  · You have unexplained:  ? Heartburn or indigestion.  ? Shortness of breath or difficulty breathing.  ? Nausea or vomiting.  ? Fatigue.  ? Feelings of nervousness or anxiety.  ? Weakness.  · You have sudden light-headedness or dizziness.  · You faint.  These symptoms may represent a serious problem that is an emergency. Do not wait to see if the symptoms will go away. Get medical help right away. Call your local emergency services (911 in the [place] Do not drive yourself to the hospital.  Summary  · Angina is extreme discomfort in the chest, neck, arm, jaw, or back that is caused by a lack of blood in the heart wall.  · There are many symptoms of angina. They include chest pain, unexplained heartburn or indigestion, sudden cold sweats, and fatigue.  · Angina may be treated with behavioral changes, medicine, or surgery.  · Symptoms of angina may represent an emergency. Get medical help right away. Call your local emergency services (911 in the [place] Do not drive yourself to the hospital.  This information is not intended to replace advice given to you by your health care provider. Make sure you discuss any questions you have with your health care provider.  Document Revised: 08/05/2019 Document Reviewed: 08/05/2019  Elsevier Patient Education © 2020 [employer]

## 2020-12-18 LAB
ALBUMIN SERPL-MCNC: 5.1 G/DL (ref 3.5–5.2)
ALBUMIN/CREAT UR: 265 MG/G CREAT (ref 0–29)
ALBUMIN/GLOB SERPL: 1.5 G/DL
ALP SERPL-CCNC: 70 U/L (ref 39–117)
ALT SERPL-CCNC: 100 U/L (ref 1–41)
AST SERPL-CCNC: 51 U/L (ref 1–40)
BILIRUB SERPL-MCNC: 0.6 MG/DL (ref 0–1.2)
BUN SERPL-MCNC: 14 MG/DL (ref 6–20)
BUN/CREAT SERPL: 19.4 (ref 7–25)
CALCIUM SERPL-MCNC: 10 MG/DL (ref 8.6–10.5)
CHLORIDE SERPL-SCNC: 98 MMOL/L (ref 98–107)
CO2 SERPL-SCNC: 25.3 MMOL/L (ref 22–29)
CREAT SERPL-MCNC: 0.72 MG/DL (ref 0.76–1.27)
CREAT UR-MCNC: 79.7 MG/DL
GLOBULIN SER CALC-MCNC: 3.3 GM/DL
GLUCOSE SERPL-MCNC: 211 MG/DL (ref 65–99)
HBA1C MFR BLD: 9.03 % (ref 4.8–5.6)
HCV AB S/CO SERPL IA: <0.1 S/CO RATIO (ref 0–0.9)
MICROALBUMIN UR-MCNC: 211.1 UG/ML
POTASSIUM SERPL-SCNC: 4 MMOL/L (ref 3.5–5.2)
PROT SERPL-MCNC: 8.4 G/DL (ref 6–8.5)
SODIUM SERPL-SCNC: 136 MMOL/L (ref 136–145)

## 2020-12-19 DIAGNOSIS — R80.9 MICROALBUMINURIA DUE TO TYPE 2 DIABETES MELLITUS (HCC): ICD-10-CM

## 2020-12-19 DIAGNOSIS — E11.8 TYPE 2 DIABETES MELLITUS WITH COMPLICATION, WITHOUT LONG-TERM CURRENT USE OF INSULIN (HCC): Primary | ICD-10-CM

## 2020-12-19 DIAGNOSIS — E11.29 MICROALBUMINURIA DUE TO TYPE 2 DIABETES MELLITUS (HCC): ICD-10-CM

## 2020-12-19 RX ORDER — DAPAGLIFLOZIN 10 MG/1
10 TABLET, FILM COATED ORAL EVERY MORNING
Qty: 30 TABLET | Refills: 5 | Status: SHIPPED | OUTPATIENT
Start: 2020-12-19

## 2020-12-19 RX ORDER — LISINOPRIL 5 MG/1
5 TABLET ORAL DAILY
Qty: 30 TABLET | Refills: 5 | Status: ON HOLD | OUTPATIENT
Start: 2020-12-19 | End: 2023-02-15 | Stop reason: SDUPTHER

## 2021-01-03 DIAGNOSIS — E11.8 TYPE 2 DIABETES MELLITUS WITH COMPLICATION, WITHOUT LONG-TERM CURRENT USE OF INSULIN (HCC): ICD-10-CM

## 2021-01-04 RX ORDER — ALOGLIPTIN 25 MG/1
TABLET, FILM COATED ORAL DAILY
Qty: 30 TABLET | Refills: 5 | Status: SHIPPED | OUTPATIENT
Start: 2021-01-04

## 2021-01-12 DIAGNOSIS — E11.8 TYPE 2 DIABETES MELLITUS WITH COMPLICATION, WITHOUT LONG-TERM CURRENT USE OF INSULIN (HCC): ICD-10-CM

## 2021-01-13 ENCOUNTER — TRANSCRIBE ORDERS (OUTPATIENT)
Dept: CARDIOLOGY | Facility: CLINIC | Age: 51
End: 2021-01-13

## 2021-01-13 ENCOUNTER — LAB (OUTPATIENT)
Dept: LAB | Facility: HOSPITAL | Age: 51
End: 2021-01-13

## 2021-01-13 ENCOUNTER — HOSPITAL ENCOUNTER (OUTPATIENT)
Dept: CARDIOLOGY | Facility: HOSPITAL | Age: 51
Discharge: HOME | End: 2021-01-13

## 2021-01-13 ENCOUNTER — OFFICE VISIT (OUTPATIENT)
Dept: CARDIOLOGY | Facility: CLINIC | Age: 51
End: 2021-01-13

## 2021-01-13 ENCOUNTER — TELEPHONE (OUTPATIENT)
Dept: CARDIOLOGY | Facility: CLINIC | Age: 51
End: 2021-01-13

## 2021-01-13 VITALS
WEIGHT: 238 LBS | HEIGHT: 67 IN | DIASTOLIC BLOOD PRESSURE: 80 MMHG | SYSTOLIC BLOOD PRESSURE: 130 MMHG | BODY MASS INDEX: 37.35 KG/M2

## 2021-01-13 VITALS
WEIGHT: 238 LBS | HEIGHT: 67 IN | SYSTOLIC BLOOD PRESSURE: 130 MMHG | HEART RATE: 69 BPM | DIASTOLIC BLOOD PRESSURE: 80 MMHG | BODY MASS INDEX: 37.35 KG/M2

## 2021-01-13 DIAGNOSIS — Z01.810 PREPROCEDURAL CARDIOVASCULAR EXAMINATION: ICD-10-CM

## 2021-01-13 DIAGNOSIS — G47.33 OBSTRUCTIVE SLEEP APNEA SYNDROME: ICD-10-CM

## 2021-01-13 DIAGNOSIS — R07.2 PRECORDIAL PAIN: ICD-10-CM

## 2021-01-13 DIAGNOSIS — R07.2 PRECORDIAL PAIN: Primary | ICD-10-CM

## 2021-01-13 DIAGNOSIS — I20.0 UNSTABLE ANGINA (HCC): ICD-10-CM

## 2021-01-13 DIAGNOSIS — E11.8 TYPE 2 DIABETES MELLITUS WITH COMPLICATION, WITHOUT LONG-TERM CURRENT USE OF INSULIN (HCC): ICD-10-CM

## 2021-01-13 DIAGNOSIS — E78.49 OTHER HYPERLIPIDEMIA: ICD-10-CM

## 2021-01-13 DIAGNOSIS — Z13.6 SCREENING FOR CARDIOVASCULAR CONDITION: ICD-10-CM

## 2021-01-13 DIAGNOSIS — Z13.6 SCREENING FOR CARDIOVASCULAR CONDITION: Primary | ICD-10-CM

## 2021-01-13 LAB
ANION GAP SERPL CALCULATED.3IONS-SCNC: 14.9 MMOL/L (ref 5–15)
AORTIC ARCH: 2.4 CM
BASOPHILS # BLD AUTO: 0.09 10*3/MM3 (ref 0–0.2)
BASOPHILS NFR BLD AUTO: 1.1 % (ref 0–1.5)
BH CV ECHO MEAS - ACS: 2.2 CM
BH CV ECHO MEAS - AO MAX PG (FULL): 6.1 MMHG
BH CV ECHO MEAS - AO MAX PG: 10.4 MMHG
BH CV ECHO MEAS - AO MEAN PG (FULL): 3 MMHG
BH CV ECHO MEAS - AO MEAN PG: 6 MMHG
BH CV ECHO MEAS - AO V2 MAX: 161 CM/SEC
BH CV ECHO MEAS - AO V2 MEAN: 111 CM/SEC
BH CV ECHO MEAS - AO V2 VTI: 26.7 CM
BH CV ECHO MEAS - ASC AORTA: 3.4 CM
BH CV ECHO MEAS - AVA(I,A): 2.4 CM^2
BH CV ECHO MEAS - AVA(I,D): 2.4 CM^2
BH CV ECHO MEAS - AVA(V,A): 1.8 CM^2
BH CV ECHO MEAS - AVA(V,D): 1.8 CM^2
BH CV ECHO MEAS - BSA(HAYCOCK): 2.3 M^2
BH CV ECHO MEAS - BSA: 2.2 M^2
BH CV ECHO MEAS - BZI_BMI: 37.3 KILOGRAMS/M^2
BH CV ECHO MEAS - BZI_METRIC_HEIGHT: 170.2 CM
BH CV ECHO MEAS - BZI_METRIC_WEIGHT: 108 KG
BH CV ECHO MEAS - EDV(MOD-SP2): 70 ML
BH CV ECHO MEAS - EDV(MOD-SP4): 71 ML
BH CV ECHO MEAS - EDV(TEICH): 97.3 ML
BH CV ECHO MEAS - EF(CUBED): 81.9 %
BH CV ECHO MEAS - EF(MOD-BP): 60 %
BH CV ECHO MEAS - EF(MOD-SP2): 60 %
BH CV ECHO MEAS - EF(MOD-SP4): 59.2 %
BH CV ECHO MEAS - EF(TEICH): 74.7 %
BH CV ECHO MEAS - ESV(MOD-SP2): 28 ML
BH CV ECHO MEAS - ESV(MOD-SP4): 29 ML
BH CV ECHO MEAS - ESV(TEICH): 24.6 ML
BH CV ECHO MEAS - FS: 43.5 %
BH CV ECHO MEAS - IVS/LVPW: 1
BH CV ECHO MEAS - IVSD: 1.4 CM
BH CV ECHO MEAS - LAT PEAK E' VEL: 9.7 CM/SEC
BH CV ECHO MEAS - LV DIASTOLIC VOL/BSA (35-75): 32.6 ML/M^2
BH CV ECHO MEAS - LV MASS(C)D: 256.8 GRAMS
BH CV ECHO MEAS - LV MASS(C)DI: 117.9 GRAMS/M^2
BH CV ECHO MEAS - LV MAX PG: 4.2 MMHG
BH CV ECHO MEAS - LV MEAN PG: 3 MMHG
BH CV ECHO MEAS - LV SYSTOLIC VOL/BSA (12-30): 13.3 ML/M^2
BH CV ECHO MEAS - LV V1 MAX: 103 CM/SEC
BH CV ECHO MEAS - LV V1 MEAN: 80.4 CM/SEC
BH CV ECHO MEAS - LV V1 VTI: 22.6 CM
BH CV ECHO MEAS - LVIDD: 4.6 CM
BH CV ECHO MEAS - LVIDS: 2.6 CM
BH CV ECHO MEAS - LVLD AP2: 7.5 CM
BH CV ECHO MEAS - LVLD AP4: 7 CM
BH CV ECHO MEAS - LVLS AP2: 6 CM
BH CV ECHO MEAS - LVLS AP4: 6.6 CM
BH CV ECHO MEAS - LVOT AREA (M): 2.8 CM^2
BH CV ECHO MEAS - LVOT AREA: 2.8 CM^2
BH CV ECHO MEAS - LVOT DIAM: 1.9 CM
BH CV ECHO MEAS - LVPWD: 1.4 CM
BH CV ECHO MEAS - MED PEAK E' VEL: 10 CM/SEC
BH CV ECHO MEAS - MV A DUR: 0.11 SEC
BH CV ECHO MEAS - MV A MAX VEL: 61.3 CM/SEC
BH CV ECHO MEAS - MV DEC SLOPE: 234 CM/SEC^2
BH CV ECHO MEAS - MV DEC TIME: 0.26 SEC
BH CV ECHO MEAS - MV E MAX VEL: 69.4 CM/SEC
BH CV ECHO MEAS - MV E/A: 1.1
BH CV ECHO MEAS - MV MAX PG: 3.9 MMHG
BH CV ECHO MEAS - MV MEAN PG: 2 MMHG
BH CV ECHO MEAS - MV P1/2T MAX VEL: 70.3 CM/SEC
BH CV ECHO MEAS - MV P1/2T: 88 MSEC
BH CV ECHO MEAS - MV V2 MAX: 99 CM/SEC
BH CV ECHO MEAS - MV V2 MEAN: 58.8 CM/SEC
BH CV ECHO MEAS - MV V2 VTI: 30.6 CM
BH CV ECHO MEAS - MVA P1/2T LCG: 3.1 CM^2
BH CV ECHO MEAS - MVA(P1/2T): 2.5 CM^2
BH CV ECHO MEAS - MVA(VTI): 2.1 CM^2
BH CV ECHO MEAS - PA MAX PG (FULL): 1.2 MMHG
BH CV ECHO MEAS - PA MAX PG: 2.8 MMHG
BH CV ECHO MEAS - PA V2 MAX: 84.2 CM/SEC
BH CV ECHO MEAS - PULM A REVS DUR: 0.12 SEC
BH CV ECHO MEAS - PULM A REVS VEL: 27 CM/SEC
BH CV ECHO MEAS - PULM DIAS VEL: 35.1 CM/SEC
BH CV ECHO MEAS - PULM S/D: 1.6
BH CV ECHO MEAS - PULM SYS VEL: 56.1 CM/SEC
BH CV ECHO MEAS - PVA(V,A): 3.2 CM^2
BH CV ECHO MEAS - PVA(V,D): 3.2 CM^2
BH CV ECHO MEAS - QP/QS: 0.85
BH CV ECHO MEAS - RV MAX PG: 1.7 MMHG
BH CV ECHO MEAS - RV MEAN PG: 1 MMHG
BH CV ECHO MEAS - RV V1 MAX: 64.6 CM/SEC
BH CV ECHO MEAS - RV V1 MEAN: 49.5 CM/SEC
BH CV ECHO MEAS - RV V1 VTI: 13.1 CM
BH CV ECHO MEAS - RVOT AREA: 4.2 CM^2
BH CV ECHO MEAS - RVOT DIAM: 2.3 CM
BH CV ECHO MEAS - SI(CUBED): 36.6 ML/M^2
BH CV ECHO MEAS - SI(LVOT): 29.4 ML/M^2
BH CV ECHO MEAS - SI(MOD-SP2): 19.3 ML/M^2
BH CV ECHO MEAS - SI(MOD-SP4): 19.3 ML/M^2
BH CV ECHO MEAS - SI(TEICH): 33.4 ML/M^2
BH CV ECHO MEAS - SV(CUBED): 79.8 ML
BH CV ECHO MEAS - SV(LVOT): 64.1 ML
BH CV ECHO MEAS - SV(MOD-SP2): 42 ML
BH CV ECHO MEAS - SV(MOD-SP4): 42 ML
BH CV ECHO MEAS - SV(RVOT): 54.4 ML
BH CV ECHO MEAS - SV(TEICH): 72.7 ML
BH CV ECHO MEAS - TAPSE (>1.6): 3 CM
BH CV ECHO MEASUREMENTS AVERAGE E/E' RATIO: 7.05
BH CV XLRA - RV BASE: 3 CM
BH CV XLRA - RV LENGTH: 7.2 CM
BH CV XLRA - RV MID: 2.4 CM
BH CV XLRA - TDI S': 19.1 CM/SEC
BUN SERPL-MCNC: 11 MG/DL (ref 6–20)
BUN/CREAT SERPL: 15.7 (ref 7–25)
CALCIUM SPEC-SCNC: 9.5 MG/DL (ref 8.6–10.5)
CHLORIDE SERPL-SCNC: 101 MMOL/L (ref 98–107)
CO2 SERPL-SCNC: 22.1 MMOL/L (ref 22–29)
CREAT SERPL-MCNC: 0.7 MG/DL (ref 0.76–1.27)
DEPRECATED RDW RBC AUTO: 39.6 FL (ref 37–54)
EOSINOPHIL # BLD AUTO: 0.2 10*3/MM3 (ref 0–0.4)
EOSINOPHIL NFR BLD AUTO: 2.5 % (ref 0.3–6.2)
ERYTHROCYTE [DISTWIDTH] IN BLOOD BY AUTOMATED COUNT: 13 % (ref 12.3–15.4)
GFR SERPL CREATININE-BSD FRML MDRD: 118 ML/MIN/1.73
GLUCOSE SERPL-MCNC: 315 MG/DL (ref 65–99)
HCT VFR BLD AUTO: 46.4 % (ref 37.5–51)
HGB BLD-MCNC: 16.5 G/DL (ref 13–17.7)
IMM GRANULOCYTES # BLD AUTO: 0.09 10*3/MM3 (ref 0–0.05)
IMM GRANULOCYTES NFR BLD AUTO: 1.1 % (ref 0–0.5)
LEFT ATRIUM VOLUME INDEX: 17 ML/M2
LV EF 2D ECHO EST: 60 %
LYMPHOCYTES # BLD AUTO: 2.03 10*3/MM3 (ref 0.7–3.1)
LYMPHOCYTES NFR BLD AUTO: 25.3 % (ref 19.6–45.3)
MCH RBC QN AUTO: 30.2 PG (ref 26.6–33)
MCHC RBC AUTO-ENTMCNC: 35.6 G/DL (ref 31.5–35.7)
MCV RBC AUTO: 85 FL (ref 79–97)
MONOCYTES # BLD AUTO: 0.57 10*3/MM3 (ref 0.1–0.9)
MONOCYTES NFR BLD AUTO: 7.1 % (ref 5–12)
NEUTROPHILS NFR BLD AUTO: 5.05 10*3/MM3 (ref 1.7–7)
NEUTROPHILS NFR BLD AUTO: 62.9 % (ref 42.7–76)
NRBC BLD AUTO-RTO: 0 /100 WBC (ref 0–0.2)
PLATELET # BLD AUTO: 215 10*3/MM3 (ref 140–450)
PMV BLD AUTO: 11.7 FL (ref 6–12)
POTASSIUM SERPL-SCNC: 4.7 MMOL/L (ref 3.5–5.2)
RBC # BLD AUTO: 5.46 10*6/MM3 (ref 4.14–5.8)
SARS-COV-2 ORF1AB RESP QL NAA+PROBE: NOT DETECTED
SODIUM SERPL-SCNC: 138 MMOL/L (ref 136–145)
WBC # BLD AUTO: 8.03 10*3/MM3 (ref 3.4–10.8)

## 2021-01-13 PROCEDURE — C9803 HOPD COVID-19 SPEC COLLECT: HCPCS

## 2021-01-13 PROCEDURE — 93000 ELECTROCARDIOGRAM COMPLETE: CPT | Performed by: INTERNAL MEDICINE

## 2021-01-13 PROCEDURE — 93306 TTE W/DOPPLER COMPLETE: CPT

## 2021-01-13 PROCEDURE — 93306 TTE W/DOPPLER COMPLETE: CPT | Performed by: INTERNAL MEDICINE

## 2021-01-13 PROCEDURE — 85025 COMPLETE CBC W/AUTO DIFF WBC: CPT

## 2021-01-13 PROCEDURE — U0004 COV-19 TEST NON-CDC HGH THRU: HCPCS

## 2021-01-13 PROCEDURE — 99205 OFFICE O/P NEW HI 60 MIN: CPT | Performed by: INTERNAL MEDICINE

## 2021-01-13 PROCEDURE — 80048 BASIC METABOLIC PNL TOTAL CA: CPT

## 2021-01-13 PROCEDURE — 36415 COLL VENOUS BLD VENIPUNCTURE: CPT

## 2021-01-13 NOTE — PROGRESS NOTES
Date of Office Visit: 2021  Encounter Provider: Maria Dodson MD  Place of Service: [facility] CARDIOLOGY  Patient Name: Carlos Reese  :1970    Chief complaint  Requested by Dr. Rodriguez for evaluation of chest pain, shortness of breath.    History of Present Illness  Patient is a 50-year-old gentleman with history of diabetes, asthma, untreated sleep apnea, hyperlipidemia consumes modest amounts of alcohol and caffeine.  He was seen in 2020 at [facility] with chest pain and arm pain.  Evaluation was unremarkable with negative troponin, unremarkable EKG.  He had an outpatient adenosine Cardiolite stress test that was normal per report with an ejection fraction of 61%.  It was felt his chest pain though he did not see cardiology.  He recently saw Dr. Rodriguez and noted he has exertional diaphoresis with concerns this may represent an anginal equivalent.    Patient states that after he got he stroke 2 years ago he has had exertional diaphoresis.  This has progressively worsened in the past several weeks.  However also in the past 8 to 10 weeks he has had midsternal chest discomfort described as a sharp pain and a pressure sensation that is associated with radiation to his left arm associated with extreme weakness and shortness of breath and diaphoresis.  These are symptoms as described above where he went to the emergency room and had a stress test.  Despite the negative stress test he continues to have the same symptoms in fact yesterday when walking in his kitchen to fix a meal for his kids and his mother he had the same symptoms.    Past Medical History:   Diagnosis Date   • Arthritis    • Asthma    • GERD (gastroesophageal reflux disease)    • Other chest pain    • Type 2 diabetes mellitus with complication, without long-term current use of insulin (CMS/[facility])      Past Surgical History:   Procedure Laterality Date   • COLONOSCOPY     • HERNIA REPAIR     •  SHOULDER SURGERY       Outpatient Medications Prior to Visit   Medication Sig Dispense Refill   • albuterol sulfate  (90 Base) MCG/ACT inhaler Inhale 2 puffs Every 4 (Four) Hours As Needed for Wheezing. 18 g 5   • Alogliptin Benzoate 25 MG tablet TAKE 1 TABLET BY MOUTH DAILY. 30 tablet 5   • Blood Glucose Monitoring Suppl w/Device kit 1 each Daily. 1 each 0   • Dapagliflozin Propanediol (Farxiga) 10 MG tablet Take 10 mg by mouth Every Morning. 30 tablet 5   • glucose blood test strip Use as instructed 100 each 12   • Lancets (ONETOUCH DELICA PLUS [id number]) misc 2 (Two) Times a Day. Use to test blood sugar  12   • lisinopril (PRINIVIL,ZESTRIL) 5 MG tablet Take 1 tablet by mouth Daily. 30 tablet 5   • metFORMIN (GLUCOPHAGE) 1000 MG tablet TAKE 1 TABLET BY MOUTH 2 (TWO) TIMES A DAY WITH MEALS. 60 tablet 5     No facility-administered medications prior to visit.        Allergies as of 01/13/2021   • (No Known Allergies)     Social History     Socioeconomic History   • Marital status:      Spouse name: Not on file   • Number of children: Not on file   • Years of education: Not on file   • Highest education level: Not on file   Tobacco Use   • Smoking status: Never Smoker   • Smokeless tobacco: Former User     Types: Chew   Substance and Sexual Activity   • Alcohol use: Yes     Alcohol/week: 6.0 standard drinks     Types: 6 Cans of beer per week   • Drug use: No   • Sexual activity: Yes     Partners: Male     Family History   Problem Relation Age of Onset   • Cancer Father    • Hypertension Mother    • Stroke Mother    • Kidney disease Mother    • Heart disease Paternal Grandmother    • Heart attack Paternal Grandmother      Review of Systems   Constitution: Positive for malaise/fatigue. Negative for fever, weight gain and weight loss (20 pounds).   HENT: Negative for ear pain, hearing loss, nosebleeds and sore throat.    Eyes: Negative for double vision, pain, vision loss in left eye and vision loss in  "right eye.   Cardiovascular: Positive for chest pain, dyspnea on exertion and leg swelling.        See history of present illness.   Respiratory: Positive for cough and shortness of breath. Negative for sleep disturbances due to breathing, snoring and wheezing.    Endocrine: Negative for cold intolerance, heat intolerance and polyuria.   Skin: Negative for itching, poor wound healing and rash.   Musculoskeletal: Positive for joint pain and joint swelling. Negative for myalgias.   Gastrointestinal: Positive for diarrhea. Negative for abdominal pain, hematochezia, nausea and vomiting.        Intermittent diarrhea.  Had colonic polyps removed several years ago.  No recent blood in the stool or black stools   Genitourinary: Positive for hesitancy. Negative for hematuria.   Neurological: Positive for excessive daytime sleepiness and paresthesias. Negative for numbness and seizures.   Psychiatric/Behavioral: Negative for depression. The patient is not nervous/anxious.         Objective:     Vitals:    01/13/21 0917 01/13/21 0922   BP: 126/80 130/80   BP Location: Left arm Right arm   Pulse: 69    Weight: 108 kg (238 lb)    Height: 170.2 cm (67\")      Body mass index is 37.28 kg/m².    Vitals signs reviewed.   Constitutional:       Appearance: Well-developed.      Comments: Obese   Eyes:      General: No scleral icterus.        Right eye: No discharge.      Conjunctiva/sclera: Conjunctivae normal.      Pupils: Pupils are equal, round, and reactive to light.   HENT:      Head: Normocephalic.      Nose: Nose normal.   Neck:      Musculoskeletal: Normal range of motion and neck supple.      Thyroid: No thyromegaly.      Vascular: No JVD.   Pulmonary:      Effort: Pulmonary effort is normal. No respiratory distress.      Breath sounds: Normal breath sounds. No wheezing. No rales.   Cardiovascular:      Normal rate. Regular rhythm. Normal S1. Normal S2.      Murmurs: There is no murmur.      No gallop.   Pulses:     Intact " distal pulses.   Edema:     Peripheral edema absent.   Abdominal:      General: Bowel sounds are normal. There is no distension.      Palpations: Abdomen is soft.      Tenderness: There is no abdominal tenderness. There is no rebound.   Musculoskeletal: Normal range of motion.         General: No tenderness.   Skin:     General: Skin is warm and dry.      Findings: No erythema or rash.   Neurological:      Mental Status: Alert and oriented to person, place, and time.   Psychiatric:         Behavior: Behavior normal.         Thought Content: Thought content normal.         Judgment: Judgment normal.       Lab Review:     ECG 12 Lead    Date/Time: 1/13/2021 9:24 AM  Performed by: Maria Dodson MD  Authorized by: Maria Dodson MD   Comparison: compared with previous ECG   Similar to previous ECG  Rhythm: sinus rhythm    Clinical impression: normal ECG          Assessment:       Diagnosis Plan   1. Precordial pain  ECG 12 Lead    Adult Transthoracic Echo Complete W/ Cont if Necessary Per Protocol    Case Request Cath Lab: Coronary angiography, Left heart cath, Left ventriculography   2. Unstable angina (CMS/[facility])  Case Request Cath Lab: Coronary angiography, Left heart cath, Left ventriculography   3. Type 2 diabetes mellitus with complication, without long-term current use of insulin (CMS/[facility])     4. Obstructive sleep apnea syndrome     5. Other hyperlipidemia       Plan:       1.  Chest pain.  Progressive anginal symptoms and despite negative stress test he has multiple risk factors for premature atherosclerotic disease.  Given the severity of his symptoms we will check an echocardiogram and proceed with cardiac catheterization.  The risks and benefits of this including risks of myocardial infarction, death, renal failure, stroke, local injury to artery nerve or vein of the hand were discussed with the patient and wife (present by phone).  They understand and wish to proceed.  2.  Exertional diaphoresis.  I discussed  with patient and wife that these symptoms are likely not completely associated with chest pain symptoms as they have been present for over 2 years.  However we will try to clarify coronary status and then may need further endocrine evaluation and treatment of sleep apnea.  Also await echocardiogram results to assess for pulmonary hypertension.  3.  Untreated sleep apnea.  As above.  Strongly recommend he also follow-up with sleep physicians to consider additional treatment options for sleep apnea  4.  Dyslipidemia, HDL remains low  5.  Diabetes, recommendations per Dr. Rodriguez  6.  Depression.  His wife states that patient is depressed feeling he is not contributing to his home.  Hopefully with further clarification of his coronary status and symptoms this will help him.  I also told him to treat sleep apnea and will need to see Dr. Miller for further treatment of depressioel    I spent 60 minutes spent on reviewing records in epic in addition to additional 25 pages of outside records including labs and stress test results and 30-minute minutes of face to face time with this patient record and the patient.       Your medication list          Accurate as of January 13, 2021 10:14 AM. If you have any questions, ask your nurse or doctor.            CONTINUE taking these medications      Instructions Last Dose Given Next Dose Due   albuterol sulfate  (90 Base) MCG/ACT inhaler  Commonly known as: PROVENTIL HFA;VENTOLIN HFA;PROAIR HFA      Inhale 2 puffs Every 4 (Four) Hours As Needed for Wheezing.       Alogliptin Benzoate 25 MG tablet      TAKE 1 TABLET BY MOUTH DAILY.       Blood Glucose Monitoring Suppl w/Device kit      1 each Daily.       Farxiga 10 MG tablet  Generic drug: Dapagliflozin Propanediol      Take 10 mg by mouth Every Morning.       glucose blood test strip      Use as instructed       lisinopril 5 MG tablet  Commonly known as: PRINIVIL,ZESTRIL      Take 1 tablet by mouth Daily.       metFORMIN 1000  MG tablet  Commonly known as: GLUCOPHAGE      TAKE 1 TABLET BY MOUTH 2 (TWO) TIMES A DAY WITH MEALS.       OneTouch Delica Plus [id number] misc      2 (Two) Times a Day. Use to test blood sugar              Patient is no longer taking -.  I corrected the med list to reflect this.  I did not stop these medications.    Dictated utilizing Dragon dictation

## 2021-01-13 NOTE — H&P (VIEW-ONLY)
Date of Office Visit: 2021  Encounter Provider: Maria Dodson MD  Place of Service: [facility] CARDIOLOGY  Patient Name: Carlos Reese  :1970    Chief complaint  Requested by Dr. Rodriguez for evaluation of chest pain, shortness of breath.    History of Present Illness  Patient is a 50-year-old gentleman with history of diabetes, asthma, untreated sleep apnea, hyperlipidemia consumes modest amounts of alcohol and caffeine.  He was seen in 2020 at [facility] with chest pain and arm pain.  Evaluation was unremarkable with negative troponin, unremarkable EKG.  He had an outpatient adenosine Cardiolite stress test that was normal per report with an ejection fraction of 61%.  It was felt his chest pain though he did not see cardiology.  He recently saw Dr. Rodriguez and noted he has exertional diaphoresis with concerns this may represent an anginal equivalent.    Patient states that after he got he stroke 2 years ago he has had exertional diaphoresis.  This has progressively worsened in the past several weeks.  However also in the past 8 to 10 weeks he has had midsternal chest discomfort described as a sharp pain and a pressure sensation that is associated with radiation to his left arm associated with extreme weakness and shortness of breath and diaphoresis.  These are symptoms as described above where he went to the emergency room and had a stress test.  Despite the negative stress test he continues to have the same symptoms in fact yesterday when walking in his kitchen to fix a meal for his kids and his mother he had the same symptoms.    Past Medical History:   Diagnosis Date   • Arthritis    • Asthma    • GERD (gastroesophageal reflux disease)    • Other chest pain    • Type 2 diabetes mellitus with complication, without long-term current use of insulin (CMS/[facility])      Past Surgical History:   Procedure Laterality Date   • COLONOSCOPY     • HERNIA REPAIR     •  SHOULDER SURGERY       Outpatient Medications Prior to Visit   Medication Sig Dispense Refill   • albuterol sulfate  (90 Base) MCG/ACT inhaler Inhale 2 puffs Every 4 (Four) Hours As Needed for Wheezing. 18 g 5   • Alogliptin Benzoate 25 MG tablet TAKE 1 TABLET BY MOUTH DAILY. 30 tablet 5   • Blood Glucose Monitoring Suppl w/Device kit 1 each Daily. 1 each 0   • Dapagliflozin Propanediol (Farxiga) 10 MG tablet Take 10 mg by mouth Every Morning. 30 tablet 5   • glucose blood test strip Use as instructed 100 each 12   • Lancets (ONETOUCH DELICA PLUS [id number]) misc 2 (Two) Times a Day. Use to test blood sugar  12   • lisinopril (PRINIVIL,ZESTRIL) 5 MG tablet Take 1 tablet by mouth Daily. 30 tablet 5   • metFORMIN (GLUCOPHAGE) 1000 MG tablet TAKE 1 TABLET BY MOUTH 2 (TWO) TIMES A DAY WITH MEALS. 60 tablet 5     No facility-administered medications prior to visit.        Allergies as of 01/13/2021   • (No Known Allergies)     Social History     Socioeconomic History   • Marital status:      Spouse name: Not on file   • Number of children: Not on file   • Years of education: Not on file   • Highest education level: Not on file   Tobacco Use   • Smoking status: Never Smoker   • Smokeless tobacco: Former User     Types: Chew   Substance and Sexual Activity   • Alcohol use: Yes     Alcohol/week: 6.0 standard drinks     Types: 6 Cans of beer per week   • Drug use: No   • Sexual activity: Yes     Partners: Male     Family History   Problem Relation Age of Onset   • Cancer Father    • Hypertension Mother    • Stroke Mother    • Kidney disease Mother    • Heart disease Paternal Grandmother    • Heart attack Paternal Grandmother      Review of Systems   Constitution: Positive for malaise/fatigue. Negative for fever, weight gain and weight loss (20 pounds).   HENT: Negative for ear pain, hearing loss, nosebleeds and sore throat.    Eyes: Negative for double vision, pain, vision loss in left eye and vision loss in  "right eye.   Cardiovascular: Positive for chest pain, dyspnea on exertion and leg swelling.        See history of present illness.   Respiratory: Positive for cough and shortness of breath. Negative for sleep disturbances due to breathing, snoring and wheezing.    Endocrine: Negative for cold intolerance, heat intolerance and polyuria.   Skin: Negative for itching, poor wound healing and rash.   Musculoskeletal: Positive for joint pain and joint swelling. Negative for myalgias.   Gastrointestinal: Positive for diarrhea. Negative for abdominal pain, hematochezia, nausea and vomiting.        Intermittent diarrhea.  Had colonic polyps removed several years ago.  No recent blood in the stool or black stools   Genitourinary: Positive for hesitancy. Negative for hematuria.   Neurological: Positive for excessive daytime sleepiness and paresthesias. Negative for numbness and seizures.   Psychiatric/Behavioral: Negative for depression. The patient is not nervous/anxious.         Objective:     Vitals:    01/13/21 0917 01/13/21 0922   BP: 126/80 130/80   BP Location: Left arm Right arm   Pulse: 69    Weight: 108 kg (238 lb)    Height: 170.2 cm (67\")      Body mass index is 37.28 kg/m².    Vitals signs reviewed.   Constitutional:       Appearance: Well-developed.      Comments: Obese   Eyes:      General: No scleral icterus.        Right eye: No discharge.      Conjunctiva/sclera: Conjunctivae normal.      Pupils: Pupils are equal, round, and reactive to light.   HENT:      Head: Normocephalic.      Nose: Nose normal.   Neck:      Musculoskeletal: Normal range of motion and neck supple.      Thyroid: No thyromegaly.      Vascular: No JVD.   Pulmonary:      Effort: Pulmonary effort is normal. No respiratory distress.      Breath sounds: Normal breath sounds. No wheezing. No rales.   Cardiovascular:      Normal rate. Regular rhythm. Normal S1. Normal S2.      Murmurs: There is no murmur.      No gallop.   Pulses:     Intact " distal pulses.   Edema:     Peripheral edema absent.   Abdominal:      General: Bowel sounds are normal. There is no distension.      Palpations: Abdomen is soft.      Tenderness: There is no abdominal tenderness. There is no rebound.   Musculoskeletal: Normal range of motion.         General: No tenderness.   Skin:     General: Skin is warm and dry.      Findings: No erythema or rash.   Neurological:      Mental Status: Alert and oriented to person, place, and time.   Psychiatric:         Behavior: Behavior normal.         Thought Content: Thought content normal.         Judgment: Judgment normal.       Lab Review:     ECG 12 Lead    Date/Time: 1/13/2021 9:24 AM  Performed by: Maria Dodson MD  Authorized by: Maria Dodson MD   Comparison: compared with previous ECG   Similar to previous ECG  Rhythm: sinus rhythm    Clinical impression: normal ECG          Assessment:       Diagnosis Plan   1. Precordial pain  ECG 12 Lead    Adult Transthoracic Echo Complete W/ Cont if Necessary Per Protocol    Case Request Cath Lab: Coronary angiography, Left heart cath, Left ventriculography   2. Unstable angina (CMS/[facility])  Case Request Cath Lab: Coronary angiography, Left heart cath, Left ventriculography   3. Type 2 diabetes mellitus with complication, without long-term current use of insulin (CMS/[facility])     4. Obstructive sleep apnea syndrome     5. Other hyperlipidemia       Plan:       1.  Chest pain.  Progressive anginal symptoms and despite negative stress test he has multiple risk factors for premature atherosclerotic disease.  Given the severity of his symptoms we will check an echocardiogram and proceed with cardiac catheterization.  The risks and benefits of this including risks of myocardial infarction, death, renal failure, stroke, local injury to artery nerve or vein of the hand were discussed with the patient and wife (present by phone).  They understand and wish to proceed.  2.  Exertional diaphoresis.  I discussed  with patient and wife that these symptoms are likely not completely associated with chest pain symptoms as they have been present for over 2 years.  However we will try to clarify coronary status and then may need further endocrine evaluation and treatment of sleep apnea.  Also await echocardiogram results to assess for pulmonary hypertension.  3.  Untreated sleep apnea.  As above.  Strongly recommend he also follow-up with sleep physicians to consider additional treatment options for sleep apnea  4.  Dyslipidemia, HDL remains low  5.  Diabetes, recommendations per Dr. Rodriguez  6.  Depression.  His wife states that patient is depressed feeling he is not contributing to his home.  Hopefully with further clarification of his coronary status and symptoms this will help him.  I also told him to treat sleep apnea and will need to see Dr. Miller for further treatment of depressioel    I spent 60 minutes spent on reviewing records in epic in addition to additional 25 pages of outside records including labs and stress test results and 30-minute minutes of face to face time with this patient record and the patient.       Your medication list          Accurate as of January 13, 2021 10:14 AM. If you have any questions, ask your nurse or doctor.            CONTINUE taking these medications      Instructions Last Dose Given Next Dose Due   albuterol sulfate  (90 Base) MCG/ACT inhaler  Commonly known as: PROVENTIL HFA;VENTOLIN HFA;PROAIR HFA      Inhale 2 puffs Every 4 (Four) Hours As Needed for Wheezing.       Alogliptin Benzoate 25 MG tablet      TAKE 1 TABLET BY MOUTH DAILY.       Blood Glucose Monitoring Suppl w/Device kit      1 each Daily.       Farxiga 10 MG tablet  Generic drug: Dapagliflozin Propanediol      Take 10 mg by mouth Every Morning.       glucose blood test strip      Use as instructed       lisinopril 5 MG tablet  Commonly known as: PRINIVIL,ZESTRIL      Take 1 tablet by mouth Daily.       metFORMIN 1000  MG tablet  Commonly known as: GLUCOPHAGE      TAKE 1 TABLET BY MOUTH 2 (TWO) TIMES A DAY WITH MEALS.       OneTouch Delica Plus [id number] misc      2 (Two) Times a Day. Use to test blood sugar              Patient is no longer taking -.  I corrected the med list to reflect this.  I did not stop these medications.    Dictated utilizing Dragon dictation

## 2021-01-13 NOTE — TELEPHONE ENCOUNTER
Please see if echo can be done today and Tomorrow.  The echo needs to be done before the cath as I may need to change the catheter right heart cath in addition to left heart cath if pulmonary hypertension is present.

## 2021-01-14 ENCOUNTER — TELEPHONE (OUTPATIENT)
Dept: CARDIOLOGY | Facility: CLINIC | Age: 51
End: 2021-01-14

## 2021-01-14 ENCOUNTER — HOSPITAL ENCOUNTER (OUTPATIENT)
Facility: HOSPITAL | Age: 51
Setting detail: HOSPITAL OUTPATIENT SURGERY
Discharge: HOME | End: 2021-01-14
Attending: INTERNAL MEDICINE | Admitting: INTERNAL MEDICINE

## 2021-01-14 VITALS
RESPIRATION RATE: 18 BRPM | HEART RATE: 78 BPM | TEMPERATURE: 97 F | HEIGHT: 67 IN | OXYGEN SATURATION: 95 % | BODY MASS INDEX: 35.31 KG/M2 | WEIGHT: 225 LBS | DIASTOLIC BLOOD PRESSURE: 82 MMHG | SYSTOLIC BLOOD PRESSURE: 130 MMHG

## 2021-01-14 DIAGNOSIS — R07.2 PRECORDIAL PAIN: ICD-10-CM

## 2021-01-14 DIAGNOSIS — E11.8 TYPE 2 DIABETES MELLITUS WITH COMPLICATION, WITHOUT LONG-TERM CURRENT USE OF INSULIN (HCC): ICD-10-CM

## 2021-01-14 DIAGNOSIS — R07.2 PRECORDIAL PAIN: Primary | ICD-10-CM

## 2021-01-14 DIAGNOSIS — I20.0 UNSTABLE ANGINA (HCC): ICD-10-CM

## 2021-01-14 LAB — GLUCOSE BLDC GLUCOMTR-MCNC: 259 MG/DL (ref 70–130)

## 2021-01-14 PROCEDURE — 25010000002 FENTANYL CITRATE (PF) 100 MCG/2ML SOLUTION: Performed by: INTERNAL MEDICINE

## 2021-01-14 PROCEDURE — 99152 MOD SED SAME PHYS/QHP 5/>YRS: CPT | Performed by: INTERNAL MEDICINE

## 2021-01-14 PROCEDURE — C1769 GUIDE WIRE: HCPCS | Performed by: INTERNAL MEDICINE

## 2021-01-14 PROCEDURE — 93458 L HRT ARTERY/VENTRICLE ANGIO: CPT | Performed by: INTERNAL MEDICINE

## 2021-01-14 PROCEDURE — 82962 GLUCOSE BLOOD TEST: CPT

## 2021-01-14 PROCEDURE — 25010000002 HEPARIN (PORCINE) PER 1000 UNITS: Performed by: INTERNAL MEDICINE

## 2021-01-14 PROCEDURE — 25010000002 MIDAZOLAM PER 1 MG: Performed by: INTERNAL MEDICINE

## 2021-01-14 PROCEDURE — C1894 INTRO/SHEATH, NON-LASER: HCPCS | Performed by: INTERNAL MEDICINE

## 2021-01-14 PROCEDURE — 0 IOPAMIDOL PER 1 ML: Performed by: INTERNAL MEDICINE

## 2021-01-14 RX ORDER — LIDOCAINE HYDROCHLORIDE 10 MG/ML
0.1 INJECTION, SOLUTION EPIDURAL; INFILTRATION; INTRACAUDAL; PERINEURAL ONCE AS NEEDED
Status: DISCONTINUED | OUTPATIENT
Start: 2021-01-14 | End: 2021-01-14 | Stop reason: HOSPADM

## 2021-01-14 RX ORDER — MIDAZOLAM HYDROCHLORIDE 1 MG/ML
INJECTION INTRAMUSCULAR; INTRAVENOUS AS NEEDED
Status: DISCONTINUED | OUTPATIENT
Start: 2021-01-14 | End: 2021-01-14 | Stop reason: HOSPADM

## 2021-01-14 RX ORDER — SODIUM CHLORIDE 0.9 % (FLUSH) 0.9 %
3 SYRINGE (ML) INJECTION EVERY 12 HOURS SCHEDULED
Status: DISCONTINUED | OUTPATIENT
Start: 2021-01-14 | End: 2021-01-14 | Stop reason: HOSPADM

## 2021-01-14 RX ORDER — SODIUM CHLORIDE 0.9 % (FLUSH) 0.9 %
10 SYRINGE (ML) INJECTION AS NEEDED
Status: DISCONTINUED | OUTPATIENT
Start: 2021-01-14 | End: 2021-01-14 | Stop reason: HOSPADM

## 2021-01-14 RX ORDER — SODIUM CHLORIDE 9 MG/ML
75 INJECTION, SOLUTION INTRAVENOUS CONTINUOUS
Status: DISCONTINUED | OUTPATIENT
Start: 2021-01-14 | End: 2021-01-14 | Stop reason: HOSPADM

## 2021-01-14 RX ORDER — ACETAMINOPHEN 325 MG/1
650 TABLET ORAL EVERY 4 HOURS PRN
Status: DISCONTINUED | OUTPATIENT
Start: 2021-01-14 | End: 2021-01-14 | Stop reason: HOSPADM

## 2021-01-14 RX ORDER — LIDOCAINE HYDROCHLORIDE 20 MG/ML
INJECTION, SOLUTION INFILTRATION; PERINEURAL AS NEEDED
Status: DISCONTINUED | OUTPATIENT
Start: 2021-01-14 | End: 2021-01-14 | Stop reason: HOSPADM

## 2021-01-14 RX ORDER — FENTANYL CITRATE 50 UG/ML
INJECTION, SOLUTION INTRAMUSCULAR; INTRAVENOUS AS NEEDED
Status: DISCONTINUED | OUTPATIENT
Start: 2021-01-14 | End: 2021-01-14 | Stop reason: HOSPADM

## 2021-01-14 RX ADMIN — SODIUM CHLORIDE 75 ML/HR: 9 INJECTION, SOLUTION INTRAVENOUS at 13:08

## 2021-01-14 NOTE — TELEPHONE ENCOUNTER
Please let the pt knoe that echo shows a strong heart that is moderately thick from high BP Glucose very high    Please let the cath lab know early tomorrow am to check glucose. anastacia

## 2021-01-14 NOTE — DISCHARGE INSTRUCTIONS
[facility]  [address] [place] [address]    Coronary Angiogram (Radial/Ulnar Approach) After Care    Refer to this sheet in the next few weeks. These instructions provide you with information on caring for yourself after your procedure. Your caregiver may also give you more specific instructions. Your treatment has been planned according to current medical practices, but problems sometimes occur. Call your caregiver if you have any problems or questions after your procedure.    Home Care Instructions:  · You may shower the day after the procedure. Remove the bandage (dressing) and gently wash the site with plain soap and water. Gently pat the site dry. You may apply a band aid daily for 2 days if desired.    · Do not apply powder or lotion to the site.  · Do not submerge the affected site in water for 3 to 5 days or until the site is completely healed.   · Do not lift, push or pull anything over 5 pounds for 5 days after your procedure. As a reference, a gallon of milk weighs 8 pounds.   · Inspect the site at least twice daily. You may notice some bruising at the site and it may be tender for 1 to 2 weeks.     · Increase your fluid intake for the next 2 days.    · Keep arm elevated for 24 hours. For the remainder of the day, keep your arm in “Pledge of Allegiance” position when up and about.     · You may drive 24 hours after the procedure unless otherwise instructed by your caregiver.  · Do not operate machinery or power tools for 24 hours.  · A responsible adult should be with you for the first 24 hours after you arrive home. Do not make any important legal decisions or sign legal papers for 24 hours.  Do not drink alcohol for 24 hours.    · Metformin or any medications containing Metformin should not be taken for 48 hours after your procedure.      Call Your Doctor if:   · You have unusual pain at the radial/ulnar (wrist) site.  · You have redness, warmth, swelling, or pain at the  radial/ulnar (wrist) site.  · You have drainage (other than a small amount of blood on the dressing).  · You have chills or a fever > 101.  · Your arm becomes pale or dark, cool, tingly, or numb.  · You have heavy bleeding from the site, hold pressure on the site for 20 minutes.  If the bleeding stops, apply a fresh bandage and call your cardiologist.  However, if you continue to have bleeding, call 911.

## 2021-01-15 NOTE — TELEPHONE ENCOUNTER
Patient notified of results and recommendations and verbalized understanding    He is awaiting a call to schedule CT angio of chest and to follow up with NP in 3 months.  Schedulers please call the pt and schedule  Thanks  Adia Galicia RN  Triage nurse

## 2021-01-15 NOTE — TELEPHONE ENCOUNTER
Please let the pt know chest pain not from blockage and he has mild disease and should be aggressive in treating cholesterol, BP and diabetes. Pleas calint him know I recommend a CT angio ches tto look at the aorta and lung structures more tahn an CXR. I do not see he ahs had this. I placed an order to do this ealry next week and increase hydration.  Please have him see pcp re elevated glucose soon andif CYT negative,  look for other reasons of sweating, weakness and may need to see neurologybut defer to pcp. F/p with aprn in 3 months. Stay on ecasa 81 mg a day

## 2021-02-11 ENCOUNTER — APPOINTMENT (OUTPATIENT)
Dept: CT IMAGING | Facility: HOSPITAL | Age: 51
End: 2021-02-11

## 2021-02-25 ENCOUNTER — TELEPHONE (OUTPATIENT)
Dept: CARDIOLOGY | Facility: CLINIC | Age: 51
End: 2021-02-25

## 2021-02-25 ENCOUNTER — HOSPITAL ENCOUNTER (OUTPATIENT)
Dept: CT IMAGING | Facility: HOSPITAL | Age: 51
Discharge: HOME | End: 2021-02-25
Admitting: INTERNAL MEDICINE

## 2021-02-25 LAB — CREAT BLDA-MCNC: 0.6 MG/DL (ref 0.6–1.3)

## 2021-02-25 PROCEDURE — 82565 ASSAY OF CREATININE: CPT

## 2021-02-25 PROCEDURE — 0 IOPAMIDOL PER 1 ML: Performed by: INTERNAL MEDICINE

## 2021-02-25 PROCEDURE — 71275 CT ANGIOGRAPHY CHEST: CPT

## 2021-02-25 RX ADMIN — IOPAMIDOL 95 ML: 755 INJECTION, SOLUTION INTRAVENOUS at 11:13

## 2021-03-01 NOTE — TELEPHONE ENCOUNTER
Recommendations passed on to pt and he verbalized understanding   Thanks  Adia Galicia RN  Triage nurse

## 2021-03-01 NOTE — TELEPHONE ENCOUNTER
Please let patient know that we will likely recheck CTA chest in 1 year unless he develops pain or shoulder blade pain sooner.  He may get a sooner scan through PCP for lung micronodules but if not will likely check 1 in 1 year or sooner as above.  Keep a follow-up to discuss further.

## 2021-05-26 ENCOUNTER — OFFICE VISIT (OUTPATIENT)
Dept: CARDIOLOGY | Facility: CLINIC | Age: 51
End: 2021-05-26

## 2021-05-26 VITALS
WEIGHT: 229.4 LBS | DIASTOLIC BLOOD PRESSURE: 76 MMHG | HEIGHT: 67 IN | SYSTOLIC BLOOD PRESSURE: 128 MMHG | HEART RATE: 65 BPM | BODY MASS INDEX: 36 KG/M2

## 2021-05-26 DIAGNOSIS — R06.09 DOE (DYSPNEA ON EXERTION): Primary | ICD-10-CM

## 2021-05-26 DIAGNOSIS — R00.2 POUNDING HEARTBEAT: ICD-10-CM

## 2021-05-26 DIAGNOSIS — R61 DIAPHORESIS: ICD-10-CM

## 2021-05-26 DIAGNOSIS — R91.1 LUNG NODULE: ICD-10-CM

## 2021-05-26 DIAGNOSIS — G47.33 OBSTRUCTIVE SLEEP APNEA SYNDROME: ICD-10-CM

## 2021-05-26 DIAGNOSIS — J45.909 ASTHMA, UNSPECIFIED ASTHMA SEVERITY, UNSPECIFIED WHETHER COMPLICATED, UNSPECIFIED WHETHER PERSISTENT: ICD-10-CM

## 2021-05-26 PROCEDURE — 99214 OFFICE O/P EST MOD 30 MIN: CPT | Performed by: NURSE PRACTITIONER

## 2021-05-26 PROCEDURE — 93000 ELECTROCARDIOGRAM COMPLETE: CPT | Performed by: NURSE PRACTITIONER

## 2021-05-26 NOTE — PROGRESS NOTES
Date of Office Visit: 2021  Encounter Provider: An Freed, NAZ, APRN  Place of Service: [facility] CARDIOLOGY  Patient Name: Carlos Reese  :1970        Subjective:     Chief Complaint:  Shortness of breath with exertion, diaphoresis, LVH      History of Present Illness:  Carlos Reese is a 50 y.o. male patient of Dr. Dodson.  I am seeing this patient in the office today for the first time and I reviewed his records.    Patient has a history of diabetes, asthma, untreated sleep apnea, hyperlipidemia, obesity, heat stroke, excessive diaphoresis LVH.    Patient was seen at [facility] 2020 with chest and arm pain.  Evaluation was unremarkable with negative troponin and unremarkable EKG.  He had an outpatient adenosine Cardiolite stress test which was normal with EF of 61%.  He was seen by primary care and reported exertional diaphoresis and concern that this may be an anginal equivalent.  He reported it has been going on since his stroke 2 years prior.  It had progressively worsened and was also getting a midsternal sharp discomfort that would radiate to his left arm.  Was also having shortness of breath and diaphoresis.  He continued to have symptoms despite negative stress test.  Echo 2021 showed normal LV systolic function with EF of 60%, moderate LVH, no significant valvular disease.  Heart cath 2021 showed 30% LAD lesion but no obstructive coronary disease.  LV systolic function and filling pressures were normal.  Evaluation for other causes of chest discomfort were recommended.      Patient presents to office today for follow-up appointment.  Patient reports he is doing okay overall since last visit.  He walks almost 1 mile a day which is about 30 minutes at a time.  He is also active around the house and mows his lawn with a push mower for about 20 minutes.  He continues to get shortness of breath with exertion which is unchanged.  He continues  to get some random chest discomfort which does not sound typical for cardiac discomfort at this time.  Denies any palpitations, racing heartbeat sensation, syncope, near syncope, falls, or abnormal bleeding.  Still has chronic fatigue in the setting of untreated sleep apnea.  Continues to get some mild dependent edema by the end of the day which has been going on intermittently for years.  Blood pressure and heart rate well controlled in the office today.        Past Medical History:   Diagnosis Date   • Arthritis    • Asthma    • GERD (gastroesophageal reflux disease)    • Other chest pain    • Type 2 diabetes mellitus with complication, without long-term current use of insulin (CMS/[facility])      Past Surgical History:   Procedure Laterality Date   • CARDIAC CATHETERIZATION N/A 1/14/2021    Procedure: Coronary angiography;  Surgeon: Kathy Galvez MD;  Location:  [facility];  Service: Cardiovascular;  Laterality: N/A;   • CARDIAC CATHETERIZATION N/A 1/14/2021    Procedure: Left heart cath;  Surgeon: Kathy Galvez MD;  Location:  [facility];  Service: Cardiovascular;  Laterality: N/A;   • CARDIAC CATHETERIZATION N/A 1/14/2021    Procedure: Left ventriculography;  Surgeon: Kathy Galvez MD;  Location:  [facility];  Service: Cardiovascular;  Laterality: N/A;   • COLONOSCOPY  2014   • HERNIA REPAIR     • SHOULDER SURGERY       Outpatient Medications Prior to Visit   Medication Sig Dispense Refill   • albuterol sulfate  (90 Base) MCG/ACT inhaler Inhale 2 puffs Every 4 (Four) Hours As Needed for Wheezing. 18 g 5   • Alogliptin Benzoate 25 MG tablet TAKE 1 TABLET BY MOUTH DAILY. 30 tablet 5   • Blood Glucose Monitoring Suppl w/Device kit 1 each Daily. 1 each 0   • Dapagliflozin Propanediol (Farxiga) 10 MG tablet Take 10 mg by mouth Every Morning. 30 tablet 5   • glucose blood test strip Use as instructed 100 each 12   • Lancets (ONETOUCH DELICA PLUS [id number])  "misc 2 (Two) Times a Day. Use to test blood sugar  12   • lisinopril (PRINIVIL,ZESTRIL) 5 MG tablet Take 1 tablet by mouth Daily. 30 tablet 5   • metFORMIN (GLUCOPHAGE) 1000 MG tablet Take 1 tablet by mouth 2 (Two) Times a Day With Meals. 60 tablet 5     No facility-administered medications prior to visit.       Allergies as of 05/26/2021   • (No Known Allergies)     Social History     Socioeconomic History   • Marital status:      Spouse name: Not on file   • Number of children: Not on file   • Years of education: Not on file   • Highest education level: Not on file   Tobacco Use   • Smoking status: Never Smoker   • Smokeless tobacco: Former User     Types: Chew   Substance and Sexual Activity   • Alcohol use: Yes     Alcohol/week: 5.0 standard drinks     Types: 5 Cans of beer per week     Comment: 6 BEERS PER WEEK.    • Drug use: No   • Sexual activity: Yes     Partners: Female     Family History   Problem Relation Age of Onset   • Cancer Father    • Hypertension Mother    • Stroke Mother    • Kidney disease Mother    • Heart disease Paternal Grandmother    • Heart attack Paternal Grandmother        Review of Systems   Constitutional: Positive for malaise/fatigue.   Cardiovascular:        SEE HPI   Respiratory: Positive for cough, snoring and wheezing.         \"chronic asthma\"    Hematologic/Lymphatic: Negative for bleeding problem.   Musculoskeletal: Positive for joint pain and myalgias. Negative for falls.   Gastrointestinal: Positive for diarrhea. Negative for melena, nausea and vomiting.   Genitourinary: Negative for hematuria.   Psychiatric/Behavioral: Positive for depression. Negative for altered mental status and substance abuse. The patient is not nervous/anxious.           Objective:     Vitals:    05/26/21 1328   BP: 128/76   BP Location: Left arm   Patient Position: Sitting   Cuff Size: Large Adult   Pulse: 65   Weight: 104 kg (229 lb 6.4 oz)   Height: 170.2 cm (67\")     Body mass index is 35.93 " kg/m².      PHYSICAL EXAM:  Constitutional:       General: Not in acute distress.     Appearance: Well-developed. Not diaphoretic.   Eyes:      Pupils: Pupils are equal, round, and reactive to light.   HENT:      Head: Normocephalic and atraumatic.   Neck:      Vascular: No carotid bruit or JVD.   Pulmonary:      Effort: Pulmonary effort is normal. No respiratory distress.      Breath sounds: Normal breath sounds. No wheezing. No rales.   Cardiovascular:      Normal rate. Regular rhythm.      Murmurs: There is no murmur.      No gallop. No click. No rub.   Edema:     Peripheral edema absent.   Abdominal:      General: Bowel sounds are normal. There is no distension.      Palpations: Abdomen is soft.   Musculoskeletal: Normal range of motion.         General: No tenderness or deformity.      Cervical back: Neck supple. Skin:     General: Skin is warm and dry.      Findings: No erythema or rash.   Neurological:      Mental Status: Alert and oriented to person, place, and time.   Psychiatric:         Behavior: Behavior normal.         Judgment: Judgment normal.             ECG 12 Lead    Date/Time: 5/26/2021 12:30 PM  Performed by: An Freed DNP, APRN  Authorized by: An Freed DNP, JENNA   Comparison: compared with previous ECG   Rhythm: sinus rhythm  Rate: normal  BPM: 65  Comments: No significant changes from previous EKG              Assessment:       Diagnosis Plan   1. NORTH (dyspnea on exertion)     2. Diaphoresis     3. Pounding heartbeat           Plan:     1. Chest pain: With recent heart cath 1/2021 showing no evidence of obstructive coronary disease, normal LV function and filling pressures.  There seems to be a muscular component to this as when he has the discomfort he is able to reproduce it with palpation.  Will discuss this further with primary care.   2. Chronic shortness of breath with exertion: Also with history of asthma.  At this point would recommend pulmonology eval.  No evidence  of pulmonary hypertension on echo and filling pressures were normal on heart cath with no obstructive coronary disease.  Do also recommend trying to work on healthy weight loss.  3. Chronic fatigue and untreated sleep apnea: He was not able to tolerate the full facemask which was the only mask he tried.  Highly recommended looking back into treating sleep apnea.  He would like to discuss this with pulmonology as well and is willing to try other masks and treatment options if he is a good candidate for these.  4. LVH: He denies hypertension.  Possibly from untreated sleep apnea.  Highly recommend treating sleep apnea and continue with blood pressure goal of less than 130/80.  5. Exertional diaphoresis: This has been going on since his stroke.  However also with uncontrolled diabetes.  Would recommend further endocrine evaluation and treatment of sleep apnea.  If symptoms continue despite sleep apnea treatment, diabetes control, and normal Endo eval then may need to discuss further with neurology?   6. Pounding heartbeat sensation: He gets an occasional pounding heartbeat sensation with activities.  We will place a 24-hour Holter and make sure he is active on the day he is wearing it to see if symptoms correlate with any arrhythmia.  7. Borderline enlarged aortic root and ascending aorta: We will look at repeating a CTA chest in about 3 months to ensure stability and then go out to yearly imaging.  8. Nonobstructive coronary disease: LAD with 30% disease.  Really need better diabetes control and need cholesterol control with LDL goal less than 70 and triglycerides below 150.    9. Lung nodules: We will refer to pulmonology.  10. Obesity: Highly recommended healthy diet and lifestyle and working on healthy weight loss.  11. Hepatic steatosis: Following with primary care.  12. Diabetes: Managed by PCP.  13. Dyslipidemia: Managed by PCP  14. Depression    Patient to follow-up with Dr. Dodson in 3 months or sooner if needed  for any new, recurrent, or worsening symptoms or other issues or concerns.  Discussed in detail signs/symptoms that warrant sooner call or follow-up to the office.      Records reviewed including but not limited to 1/2021 echo, 1/2021 heart cath, 2/2021 CTA chest, 1/2021 CBC and CMP.           Your medication list          Accurate as of May 26, 2021 11:59 PM. If you have any questions, ask your nurse or doctor.            CONTINUE taking these medications      Instructions Last Dose Given Next Dose Due   albuterol sulfate  (90 Base) MCG/ACT inhaler  Commonly known as: PROVENTIL HFA;VENTOLIN HFA;PROAIR HFA      Inhale 2 puffs Every 4 (Four) Hours As Needed for Wheezing.       Alogliptin Benzoate 25 MG tablet      TAKE 1 TABLET BY MOUTH DAILY.       Blood Glucose Monitoring Suppl w/Device kit      1 each Daily.       Farxiga 10 MG tablet  Generic drug: Dapagliflozin Propanediol      Take 10 mg by mouth Every Morning.       glucose blood test strip      Use as instructed       lisinopril 5 MG tablet  Commonly known as: PRINIVIL,ZESTRIL      Take 1 tablet by mouth Daily.       metFORMIN 1000 MG tablet  Commonly known as: GLUCOPHAGE      Take 1 tablet by mouth 2 (Two) Times a Day With Meals.       OneTouch Delica Plus [id number] misc      2 (Two) Times a Day. Use to test blood sugar              The above medication changes may not have been made by this provider.  Patient's medication list was updated to reflect medications they are currently taking including medication changes made by other providers.            Thanks,    An Freed, NAZ, APRN  05/26/2021         Dictated utilizing Dragon dictation

## 2021-05-27 PROBLEM — R61 DIAPHORESIS: Status: ACTIVE | Noted: 2021-05-27

## 2021-05-27 PROBLEM — R06.09 DOE (DYSPNEA ON EXERTION): Status: ACTIVE | Noted: 2021-05-27

## 2021-05-27 PROBLEM — R00.2 POUNDING HEARTBEAT: Status: ACTIVE | Noted: 2021-05-27

## 2021-06-23 ENCOUNTER — TELEPHONE (OUTPATIENT)
Dept: CARDIOLOGY | Facility: CLINIC | Age: 51
End: 2021-06-23

## 2022-05-18 ENCOUNTER — OFFICE VISIT (OUTPATIENT)
Dept: PAIN MEDICINE | Facility: CLINIC | Age: 52
End: 2022-05-18

## 2022-05-18 VITALS
WEIGHT: 228.8 LBS | HEART RATE: 79 BPM | TEMPERATURE: 96.9 F | BODY MASS INDEX: 35.91 KG/M2 | DIASTOLIC BLOOD PRESSURE: 90 MMHG | OXYGEN SATURATION: 98 % | RESPIRATION RATE: 16 BRPM | HEIGHT: 67 IN | SYSTOLIC BLOOD PRESSURE: 145 MMHG

## 2022-05-18 DIAGNOSIS — G89.4 CHRONIC PAIN SYNDROME: Primary | ICD-10-CM

## 2022-05-18 DIAGNOSIS — M54.2 NECK PAIN: ICD-10-CM

## 2022-05-18 DIAGNOSIS — M47.816 LUMBAR FACET JOINT SYNDROME: ICD-10-CM

## 2022-05-18 DIAGNOSIS — M51.36 DDD (DEGENERATIVE DISC DISEASE), LUMBAR: ICD-10-CM

## 2022-05-18 PROCEDURE — 99214 OFFICE O/P EST MOD 30 MIN: CPT | Performed by: NURSE PRACTITIONER

## 2022-05-18 RX ORDER — GABAPENTIN 300 MG/1
300 CAPSULE ORAL 3 TIMES DAILY
Qty: 90 CAPSULE | Refills: 1 | Status: SHIPPED | OUTPATIENT
Start: 2022-05-18 | End: 2022-07-12

## 2022-05-18 NOTE — PROGRESS NOTES
CHIEF COMPLAINT  FOLLOW UP LUMBAR/CERVICAL PAIN   Patient in office complains of bilateral lower back pain states it got worse over the last few months . States pain shoots down both legs ,Describes pain as constant aching,burning and stabbing . Also complains of cervical pain worst on his left side  pain shoots down into left hand .     Subjective   Carlos Reese is a 51 y.o. male  who presents for follow-up.  He has a history of chronic back pain.  Reports that his pain has become progressively worse over the past few months.      bilateral L2-L5 radiofrequency ablation on 8/10/2020 - pain relief for 2-3 weeks, after that time his pain has returned to baseline.   Has had several lumbar interlaminar EVELYN's in the past. Most recent would about been 5-6 years ago. He reports little benefit with these.        Patient has had chronic back pain for the past 30 years.  Pain is located in his back and radiates to bilateral posterior legs.  He has not had any type of back surgery.  He has been through physical therapy most recent being within the last couple of months.  He reports that this was somewhat helpful.  He is also seen a chiropractor benefit was transient with this. He saw Dr. Quiñonez (neurosurgery) a few months ago, nothing surgical recommended.      He also has complaints of worsening neck pain over the past 2-3 months.  He reports that he is having headaches into the back of the head.  Aching, tingling, weakness reported of left arm.       Patient remained masked during entire encounter. No cough present. I donned a mask and eye protection throughout entire visit. Prior to donning mask and eye protection, hand hygiene was performed, as well as when it was doffed.  I was closer than 6 feet, but not for an extended period of time. No obvious exposure to any bodily fluids.    Back Pain  This is a chronic problem. The current episode started more than 1 year ago. The problem occurs daily. The problem has been  "waxing and waning since onset. The pain is present in the lumbar spine. The quality of the pain is described as aching and burning. The pain radiates to the left foot and right foot (bilateral posterior legs). The pain is at a severity of 8/10. The symptoms are aggravated by position, standing, bending and twisting (\"Anything\"). Associated symptoms include headaches, numbness (feet) and weakness (legs). Pertinent negatives include no abdominal pain, chest pain, dysuria or fever. He has tried analgesics, NSAIDs, muscle relaxant, chiropractic manipulation, home exercises, heat and ice (injections) for the symptoms. The treatment provided mild relief.     PEG Assessment   What number best describes your pain on average in the past week?9  What number best describes how, during the past week, pain has interfered with your enjoyment of life?9  What number best describes how, during the past week, pain has interfered with your general activity?  9    The following portions of the patient's history were reviewed and updated as appropriate: allergies, current medications, past family history, past medical history, past social history, past surgical history and problem list.    Review of Systems   Constitutional: Positive for activity change (decreased) and fatigue. Negative for fever.   HENT: Negative for congestion.    Eyes: Negative for visual disturbance.   Respiratory: Negative for cough and chest tightness.    Cardiovascular: Negative for chest pain.   Gastrointestinal: Positive for diarrhea. Negative for abdominal pain and constipation.   Genitourinary: Negative for difficulty urinating and dysuria.   Musculoskeletal: Positive for back pain and neck pain.   Neurological: Positive for weakness (legs), numbness (feet) and headaches. Negative for dizziness and light-headedness.   Psychiatric/Behavioral: Positive for agitation and sleep disturbance. Negative for suicidal ideas. The patient is not nervous/anxious.      I " "have reviewed and confirmed the accuracy of the ROS as documented by the MA/LPN/RN Shahida GutierrezJENNA    Vitals:    05/18/22 0811   BP: 145/90   BP Location: Left arm   Patient Position: Sitting   Pulse: 79   Resp: 16   Temp: 96.9 °F (36.1 °C)   SpO2: 98%   Weight: 104 kg (228 lb 12.8 oz)   Height: 170.2 cm (67\")   PainSc:   8   PainLoc: Back  Comment: neck         Objective   Physical Exam  Vitals and nursing note reviewed.   Constitutional:       General: He is not in acute distress.     Appearance: Normal appearance. He is not ill-appearing.   Pulmonary:      Effort: Pulmonary effort is normal. No respiratory distress.   Musculoskeletal:      Cervical back: Tenderness and bony tenderness present.      Lumbar back: Tenderness and bony tenderness present. Positive right straight leg raise test and positive left straight leg raise test.   Skin:     General: Skin is warm and dry.   Neurological:      Mental Status: He is alert and oriented to person, place, and time.      Motor: Motor function is intact. No weakness.      Gait: Gait normal.   Psychiatric:         Mood and Affect: Mood normal.         Behavior: Behavior normal.         Assessment & Plan   Diagnoses and all orders for this visit:    1. Chronic pain syndrome (Primary)    2. Lumbar facet joint syndrome    3. DDD (degenerative disc disease), lumbar  -     MRI Lumbar Spine Without Contrast; Future    4. Neck pain  -     MRI Cervical Spine Without Contrast; Future    Other orders  -     gabapentin (NEURONTIN) 300 MG capsule; Take 1 capsule by mouth 3 (Three) Times a Day. 1 po hs x 5 days, then 1 po bid x 5 days, then 1 po tid thereafter  Dispense: 90 capsule; Refill: 1        --- Update MRI lumbar spine  --- MRI cervical spine  --- The patient will be started on a trial of gabapentin. he will be started on gabapentin 300 mg once nightly for one week. Will then increase to twice daily for one week. Patient will then increase to three times daily as " tolerated. Reviewed potential side effects, including somnolence and dizziness.   --- Discussed maximizing acetaminophen therapy. Max 3000 mg in 24 hours  --- He will continue to avoid NSAID's due to GI issues   --- Follow-up after imaging         As the clinician, I personally reviewed the EMMA from 5/18/2022 while the patient was in the office today.    This document is intended for medical expert use only. Reading of this document by patients and/or patient's family without participating medical staff guidance may result in misinterpretation and unintended morbidity.   Any interpretation of such data is the responsibility of the patient and/or family member responsible for the patient in concert with their primary or specialist providers, not to be left for sources of online searches such as [employer], [employer] or similar queries. Relying on these approaches to knowledge may result in misinterpretation, misguided goals of care and even death should patients or family members try recommendations outside of the realm of professional medical care in a supervised way.

## 2022-06-14 ENCOUNTER — TELEPHONE (OUTPATIENT)
Dept: PAIN MEDICINE | Facility: CLINIC | Age: 52
End: 2022-06-14

## 2022-06-17 DIAGNOSIS — M51.36 DDD (DEGENERATIVE DISC DISEASE), LUMBAR: Primary | ICD-10-CM

## 2022-06-17 DIAGNOSIS — M54.2 CERVICALGIA: ICD-10-CM

## 2022-06-17 NOTE — TELEPHONE ENCOUNTER
Called [facility] today to request peer to peer. ref # [id number]. Spoke with Reva. She told me you could just call whenever you are ready within 7 days from today's date to do the peer to peer over the phone. They are available from [phone]. You can call between those hours.  The number to call is [phone] and they will transfer you to the physician. I called Corin at [facility] and informed her. Also called and informed pt. Please advise. Thank you.   
Called and informed pt Liv will order PT for cervical and lumbar. he will have to complete this (at least 6 weeks) before insurance will cover MRI     Pt sts his insurance won't cover [facility] and would like to go to [facility] in [facility]. Called and left msg for Corin informing her of all of this. Called MRI central scheduling spoke with Vega who cancelled both mri's scheduled for 6/19/22. Called and informed pt. He verbalized understanding.   
Called and spoke with John Paul
Can you confirm, were both MRI's denied or only the cervical?  
Hub staff attempted to follow warm transfer process and was unsuccessful -NO ANSWER    Caller: WILLY AT [facility]    Relationship to patient:     Best call back number: [phone]     Patient is needing: WILLY AT [facility] RETURNING CALL TO WINSTON.   SAID TO LET WINSTON KNOW THAT SHE DOES NOT HAVE TO CALL TO CANCEL.   THEIR SCHEDULING DEPARTMENT WILL CALL TO CANCEL AND WILL LET PT. KNOW.   CALL BACK WITH ANY QUESTIONS.               
I received a voicemail from Corin at [facility] [phone] stg pt is scheduled for mri cervical and mri lumbar on 6/19/22. Pt auth was denied and pt needs peer to peer review approved by Friday 6/17/22 at 3pm in order to keep appts for mri's or pt would need to be rescheduled or cancelled all together. How would you like to proceed?   
I spoke with Corin from [facility] and she states that both MRI's have been denied. She states that she would need an approval by 3 pm today or the patient will need to reschedule. The peer to peer needs to be done with [facility] phone number [phone]. The tracking number for the denials are [id number].  
Looks like both mri's were denied and both are scheduled for tomorrow, but I left msg for Corin to return my call to confirm. 
Once we have denial letters we will appeal.  Sorry. 
Initial (On Arrival)

## 2022-06-19 ENCOUNTER — APPOINTMENT (OUTPATIENT)
Dept: MRI IMAGING | Facility: HOSPITAL | Age: 52
End: 2022-06-19

## 2022-07-12 RX ORDER — GABAPENTIN 300 MG/1
300 CAPSULE ORAL 3 TIMES DAILY
Qty: 90 CAPSULE | Refills: 0 | Status: SHIPPED | OUTPATIENT
Start: 2022-07-12 | End: 2022-08-15

## 2022-08-15 RX ORDER — GABAPENTIN 300 MG/1
CAPSULE ORAL
Qty: 90 CAPSULE | Refills: 0 | Status: SHIPPED | OUTPATIENT
Start: 2022-08-15 | End: 2022-09-07 | Stop reason: ALTCHOICE

## 2022-08-15 NOTE — TELEPHONE ENCOUNTER
Reviewed JENNA Haider last office visit on 5/18/22. EMMA reviewed and are appropriate. Due to Shahida being out of office, will refill appropriately.

## 2022-09-07 ENCOUNTER — OFFICE VISIT (OUTPATIENT)
Dept: PAIN MEDICINE | Facility: CLINIC | Age: 52
End: 2022-09-07

## 2022-09-07 VITALS
HEART RATE: 90 BPM | OXYGEN SATURATION: 98 % | BODY MASS INDEX: 35.41 KG/M2 | SYSTOLIC BLOOD PRESSURE: 155 MMHG | WEIGHT: 225.6 LBS | HEIGHT: 67 IN | DIASTOLIC BLOOD PRESSURE: 96 MMHG | TEMPERATURE: 98 F

## 2022-09-07 DIAGNOSIS — G89.4 CHRONIC PAIN SYNDROME: Primary | ICD-10-CM

## 2022-09-07 DIAGNOSIS — M54.2 CERVICALGIA: ICD-10-CM

## 2022-09-07 DIAGNOSIS — M51.36 DDD (DEGENERATIVE DISC DISEASE), LUMBAR: ICD-10-CM

## 2022-09-07 PROCEDURE — 99214 OFFICE O/P EST MOD 30 MIN: CPT | Performed by: NURSE PRACTITIONER

## 2022-09-07 RX ORDER — GABAPENTIN 600 MG/1
600 TABLET ORAL 3 TIMES DAILY
Qty: 90 TABLET | Refills: 1 | Status: SHIPPED | OUTPATIENT
Start: 2022-09-07 | End: 2022-11-08

## 2022-09-07 RX ORDER — CYCLOBENZAPRINE HCL 10 MG
10 TABLET ORAL
COMMUNITY
Start: 2022-09-06

## 2022-09-07 NOTE — PROGRESS NOTES
CHIEF COMPLAINT  F/U back and neck pain- patient states that his pain has worsened since his last visit. He states that he had 4 weeks of PT. There was no improvement so therapist told him to come back to the office.     Subjective   Carlos Reese is a 51 y.o. male  who presents for follow-up.  He has a history of back and neck pain.  Patient has history of chronic back pain and more recently worsening neck pain.  I last saw the patient 5/18/2022.  At that time I recommended MRIs of the lumbar and cervical spine.  Unfortunately these were denied by insurance due to the fact that patient has never had physical therapy.  He has now completed 4 weeks of PT, he was discharged by the physical therapist due to lack of improvement, he does continue with HEP which he performs 3-5 days a week, there are some days when his pain is too severe to perform these exercises.  His pain levels are so severe participating in any further therapy is not medically possible.    Patient underwent bilateral L2-L5 RFA 8/10/2020 with relief for only 2 to 3 weeks, he reports that he had lumbar epidural steroid injections (interlaminar) 5 to 6 years ago with little benefit.  No history of back surgery.  In addition to recent physical therapy he has had physical therapy in the past without much benefit.  Chiropractic treatment was minimally beneficial.  He was evaluated by neurosurgery (Dr. Quiñonez) 11/3/2020.  Nothing recommended from a neurosurgical standpoint at that time.    Pain today 8/10 VAS.  He has been taking Gabapentin 300 mg tid, Tylenol, Flexeril PRN.  Avoids NSAID's due to GI issues.  He reports minimal benefit with the regimen.  Denies any adverse reactions.      Patient saw JENNA Ramírez at the [facility] on 7/12/2022.  Evaluated for low back pain.  No current surgical recommendations.  Recommending PT and also given a muscle relaxant.    Back Pain  This is a chronic problem. The current episode  "started more than 1 year ago. The problem occurs daily. The problem has been gradually worsening since onset. The pain is present in the lumbar spine. The quality of the pain is described as aching and burning. The pain radiates to the left foot and right foot (bilateral posterior legs). The pain is at a severity of 8/10. The symptoms are aggravated by position, standing, bending and twisting (\"Anything\"). Associated symptoms include headaches, numbness and weakness. Pertinent negatives include no abdominal pain, chest pain, dysuria or fever. He has tried analgesics, NSAIDs, muscle relaxant, chiropractic manipulation, home exercises, heat and ice (injections) for the symptoms. The treatment provided mild relief.   Neck Pain   This is a recurrent problem. The current episode started more than 1 month ago. The problem occurs daily. The problem has been gradually worsening. The pain is at a severity of 8/10. Associated symptoms include headaches, numbness and weakness. Pertinent negatives include no chest pain or fever. He has tried NSAIDs, neck support, muscle relaxants, home exercises and chiropractic manipulation (PT) for the symptoms. The treatment provided mild relief.        PEG Assessment   What number best describes your pain on average in the past week?6  What number best describes how, during the past week, pain has interfered with your enjoyment of life?8  What number best describes how, during the past week, pain has interfered with your general activity?  8      The following portions of the patient's history were reviewed and updated as appropriate: allergies, current medications, past family history, past medical history, past social history, past surgical history and problem list.    Review of Systems   Constitutional: Positive for activity change (decreased). Negative for chills, fatigue and fever.   HENT: Negative for congestion.    Eyes: Negative for visual disturbance.   Respiratory: Negative for " "chest tightness and shortness of breath.    Cardiovascular: Negative for chest pain.   Gastrointestinal: Negative for abdominal pain, constipation and diarrhea.   Genitourinary: Negative for difficulty urinating and dysuria.   Musculoskeletal: Positive for back pain and neck pain.   Neurological: Positive for weakness, numbness and headaches. Negative for dizziness and light-headedness.   Psychiatric/Behavioral: Positive for agitation and sleep disturbance. The patient is not nervous/anxious.      I have reviewed and confirmed the accuracy of the ROS as documented by the MA/LPN/RN JENNA Haider    Vitals:    09/07/22 0930   BP: 155/96   Pulse: 90   Temp: 98 °F (36.7 °C)   SpO2: 98%   Weight: 102 kg (225 lb 9.6 oz)   Height: 170.2 cm (67\")   PainSc:   8   PainLoc: Back     Objective   Physical Exam  Vitals and nursing note reviewed.   Constitutional:       General: He is not in acute distress.     Appearance: Normal appearance. He is not ill-appearing.   Pulmonary:      Effort: Pulmonary effort is normal. No respiratory distress.   Musculoskeletal:      Cervical back: Tenderness and bony tenderness present. Pain with movement present.      Lumbar back: Tenderness and bony tenderness present. Positive right straight leg raise test and positive left straight leg raise test.   Skin:     General: Skin is warm and dry.   Neurological:      Mental Status: He is alert and oriented to person, place, and time.      Motor: Motor function is intact. No weakness.      Gait: Gait normal.   Psychiatric:         Mood and Affect: Mood normal.         Behavior: Behavior normal.         Assessment & Plan   Diagnoses and all orders for this visit:    1. Chronic pain syndrome (Primary)    2. DDD (degenerative disc disease), lumbar  -     MRI Lumbar Spine Without Contrast; Future    3. Cervicalgia  -     MRI Cervical Spine Without Contrast; Future    Other orders  -     gabapentin (NEURONTIN) 600 MG tablet; Take 1 tablet by " mouth 3 (Three) Times a Day.  Dispense: 90 tablet; Refill: 1        --- MRI Lumbar spine   --- MRI Cervical spine   --- [facility] PT August 2022 if needed for records for insurance purposes   --- Increase Gabapentin to 600 mg tid.    --- Follow-up 2 months/sooner PRN         As the clinician, I personally reviewed the EMMA from 9/7/2022 while the patient was in the office today.    This document is intended for medical expert use only. Reading of this document by patients and/or patient's family without participating medical staff guidance may result in misinterpretation and unintended morbidity.   Any interpretation of such data is the responsibility of the patient and/or family member responsible for the patient in concert with their primary or specialist providers, not to be left for sources of online searches such as Loved.la, ManyWho or similar queries. Relying on these approaches to knowledge may result in misinterpretation, misguided goals of care and even death should patients or family members try recommendations outside of the realm of professional medical care in a supervised way.    Patient remained masked during entire encounter. No cough present. I donned a mask and eye protection throughout entire visit. Prior to donning mask and eye protection, hand hygiene was performed, as well as when it was doffed.  I was closer than 6 feet, but not for an extended period of time. No obvious exposure to any bodily fluids.      ADDENDUM ---  Patient went to [facility] physical therapy from 8/8/2022 until 9/1/2022.  He was discharged by the physical therapist due to meeting a maximal medical improvement/plateau of progress.  These notes have been uploaded.  The patient has continued to participate in a guided home exercise program specifically taught to him by the physical therapist.  He has been doing this from 9/1/2022 until present.  He continues to participate in this 3-5 times weekly.  His pain  continues to remain quite severe.  He is unable to tolerate additional formal therapy at this time due to the severity of his pain levels.

## 2022-09-09 ENCOUNTER — TELEPHONE (OUTPATIENT)
Dept: PAIN MEDICINE | Facility: CLINIC | Age: 52
End: 2022-09-09

## 2022-09-09 NOTE — TELEPHONE ENCOUNTER
Corin from [facility] called and left vmail stg both MRIs cervical and lumbar were denied by insurance and needs a peer to peer. However, Pt is scheduled for both MRIs on 9/16/22. Corin would like to know if you would like to do a peer to peer prior to the scheduled date 9/16/22 in order for pt to have the MRIs? The phone number for peer to peer is [phone]. The case number is [id number]. Please let me know if I can help with this. Please advise. Thank you.

## 2022-09-12 NOTE — TELEPHONE ENCOUNTER
Corin called back. She was able to upload the new PT notes from Aug. 2022 to [facility] so that when you do the peer to peer, they would be able to see it. Please advise. Thank you.

## 2022-09-12 NOTE — TELEPHONE ENCOUNTER
Corin returned my call. She sts [facility] denied MRIs for only having 4 weeks PT, [facility] wants 6 weeks of back exercises. She sts she faxed over PT notes from  and . She sts maybe the PT notes are too old, no other recent PT notes. Referral for PT in chart from June 2022. Called and spoke to pt. He sts he went to [facility] in Aug for the entire month, but we have no notes from them. Called [facility] today spoke with Hope, she will fax over Aug session. Corin std once we receive the current PT notes she can fax them in the hopes of getting this approved but not sure if we can get approval by 9/16/22 when mri's are scheduled, you may have to do peer to peer also. I will keep you informed. Please advise. Thank you.

## 2022-09-12 NOTE — TELEPHONE ENCOUNTER
We received [facility] PT notes from Aug 2022 today. Scanned in chart for your review. Called and informed pt. Also, Called and spoke with Corin with [facility]. She std you would still have to call and do the peer to peer and let them know denial was for not having 6 weeks PT but now we have current PT notes in media, hopefully they will approve. Croin sts if we don't get approval auth for mri's by 10am Friday morning, then pt will have to r/s his mri's. Pt currently scheduled for mri's on Friday 9/16/22 at 5pm. Please advise. Thank you.

## 2022-09-13 ENCOUNTER — DOCUMENTATION (OUTPATIENT)
Dept: PAIN MEDICINE | Facility: CLINIC | Age: 52
End: 2022-09-13

## 2022-09-13 NOTE — TELEPHONE ENCOUNTER
Corin returned my call. She uploaded documentation from [facility] to [facility], she will send an email to scheduling to  cancel cervical mri on 9/16/22, but lumbar mri will go on as scheduled on 9/16/22 for right now until Corin sees an approval in system. She will continually check everyday for an approval then will call and inform pt. I called and informed pt peer to peer was conducted today, awaiting lumbar MRI approval and he will get a call from central scheduling regarding cancellation of cervical mri due to info above. Informed him of return to PT for cervical mri to be approved in the future. Pt sts his neck is ok for now, wanted to take care of his back first.  Pt verbalized understanding.

## 2022-09-13 NOTE — TELEPHONE ENCOUNTER
Thank you. Called Corin at [facility] left her a msg informing her of above.

## 2022-09-13 NOTE — TELEPHONE ENCOUNTER
"I wrote a \"documentation\" in the chart today after completing the peer to peer this morning. This needs to be updated into [facility].  The lumbar MRI should be approved after this.     Regarding the cervical MRI, this is going to be denied again due to physical therapist not doing any type of cervical exercises.  He will need to return to PT for this.  He can go for 1 visit and have the physical therapist discharged him with a home exercise program for his neck.  He will have to have 6 weeks of treatment for the neck before the MRI of the cervical spine will be approved."

## 2022-09-13 NOTE — PROGRESS NOTES
Patient went to [facility] physical therapy from 8/8/2022 until 9/1/2022.  He was discharged by the physical therapist due to meeting a maximal medical improvement/plateau of progress.  These notes have been uploaded.  The patient has continued to participate in a guided home exercise program specifically taught to him by the physical therapist.  He has been doing this from 9/1/2022 until present.  He participates in this 3-5 times weekly.  He is currently completing the 6th week of therapy/HEP.  His pain continues to remain quite severe.  He is unable to tolerate additional formal therapy at this time due to the severity of his pain levels.

## 2022-09-15 ENCOUNTER — TELEPHONE (OUTPATIENT)
Dept: PAIN MEDICINE | Facility: CLINIC | Age: 52
End: 2022-09-15

## 2022-09-15 NOTE — TELEPHONE ENCOUNTER
Caller: JERROD   Relationship to Patient: SELF     Phone Number: [phone]  Reason for Call: PATIENT CALLING ASKING IF MRI SCHEDULED FOR TOMORROW 09/16/22 IS APPROVED

## 2022-09-15 NOTE — TELEPHONE ENCOUNTER
Called and spoke with pt. I informed him that Corin from [facility] called me this morning around 0945 and she has been checking the status of this, no approval from insurance yet. Corin std she will call me the moment she sees an approval for lumbar mri. If she doesn't have the approval by 10am tomorrow morning, the pt will have to be rescheduled and Religious will call him to reschedule. If we get an approval, pt can keep his mri lumbar appt as scheduled tomorrow. Pt verbalized understanding.

## 2022-09-15 NOTE — TELEPHONE ENCOUNTER
I have no idea.  Please see previous documentation with Keren.  I would say if he has not been contacted to cancel, the approval has gone through.

## 2022-09-16 ENCOUNTER — APPOINTMENT (OUTPATIENT)
Dept: MRI IMAGING | Facility: HOSPITAL | Age: 52
End: 2022-09-16

## 2022-09-16 NOTE — TELEPHONE ENCOUNTER
Corin from [facility] called me around 0952 this morning to inform me pt lumbar mri status is still pending, so pt will have to be rescheduled. [facility] will call him to r/s his lumbar mri. Called and informed pt. Corin will keep me informed when she receives a response from pending status.

## 2022-09-21 NOTE — TELEPHONE ENCOUNTER
Called [facility] spoke with Nereyda smith. She sts the case was re-reviewed after P2P and the lumbar MRI was denied again due to no documentation of supervised home exercise program, how often he did exercise, how many times daily or weekly, etc. She sts a denial letter was faxed to [phone] told her to please fax to our office. I informed her we already submitted all PT documentation and [facility]nie [facility] notes for the lumbar MRI. She sts they still need more information. Called Corin, left her a msg to return my call.

## 2022-09-21 NOTE — TELEPHONE ENCOUNTER
Corin from [facility] called today to let us know this lumbar MRI is still in pending status. Pt has been rescheduled again to 9/23/22 at 5pm for lumbar MRI, we need approval for MRI by this Friday at 10am just like before, in order for him to have his mri. Corin gave me [facility] numbers to call. [facility] [phone] and [phone]. Tracking # [id number]. I will try and call today to get update on status. Please advise. Thank you.

## 2022-09-21 NOTE — TELEPHONE ENCOUNTER
Called and spoke with Corin Bowles. She informed me she uploaded your progress note to the system but she's not sure if it was done on end of radiology dept. I'm including the supervisor of scheduling Laura on this msg. Laura, can you give us some guidance on the appeals process? Called and informed pt he will receive a call to cancel his lumbar mri. Informed him we will appeal this case. He verbalized understanding. Please advise. Thank you.

## 2022-09-21 NOTE — TELEPHONE ENCOUNTER
All of this was included in my documentation.  This is insane.  Guess all we can do is attempt to appeal the denial.

## 2022-09-21 NOTE — TELEPHONE ENCOUNTER
I do not know what else they need. When I did the P2P last week the physician stated that they typically review within 24 hours after the additional information was uploaded into their system, did you confirm that this was done on the end of the radiology department? I do not know what else is needed...

## 2022-09-23 ENCOUNTER — APPOINTMENT (OUTPATIENT)
Dept: MRI IMAGING | Facility: HOSPITAL | Age: 52
End: 2022-09-23

## 2022-11-08 RX ORDER — GABAPENTIN 600 MG/1
TABLET ORAL
Qty: 90 TABLET | Refills: 1 | Status: SHIPPED | OUTPATIENT
Start: 2022-11-08 | End: 2023-01-05

## 2023-01-05 DIAGNOSIS — M51.36 DDD (DEGENERATIVE DISC DISEASE), LUMBAR: Primary | ICD-10-CM

## 2023-01-05 RX ORDER — GABAPENTIN 600 MG/1
TABLET ORAL
Qty: 90 TABLET | Refills: 1 | Status: SHIPPED | OUTPATIENT
Start: 2023-01-05 | End: 2023-02-02 | Stop reason: ALTCHOICE

## 2023-01-26 ENCOUNTER — HOSPITAL ENCOUNTER (OUTPATIENT)
Dept: MRI IMAGING | Facility: HOSPITAL | Age: 53
Discharge: HOME | End: 2023-01-26
Admitting: NURSE PRACTITIONER
Payer: COMMERCIAL

## 2023-01-26 DIAGNOSIS — M51.36 DDD (DEGENERATIVE DISC DISEASE), LUMBAR: ICD-10-CM

## 2023-01-26 PROCEDURE — 72148 MRI LUMBAR SPINE W/O DYE: CPT

## 2023-01-27 NOTE — PROGRESS NOTES
Let him know that there is no significant change when compared to MRI from 2020.  No significant degrees of spinal stenosis or narrowing where the nerves exit the spine.  There is a small disc bulge, but again is not causing any significant degrees of narrowing.  If he would like he can schedule an office visit with myself or any APRN to discuss in further detail and consider additional interventional pain management options.

## 2023-02-02 ENCOUNTER — OFFICE VISIT (OUTPATIENT)
Dept: PAIN MEDICINE | Facility: CLINIC | Age: 53
End: 2023-02-02
Payer: COMMERCIAL

## 2023-02-02 ENCOUNTER — TRANSCRIBE ORDERS (OUTPATIENT)
Dept: SURGERY | Facility: SURGERY CENTER | Age: 53
End: 2023-02-02
Payer: COMMERCIAL

## 2023-02-02 ENCOUNTER — PREP FOR SURGERY (OUTPATIENT)
Dept: SURGERY | Facility: SURGERY CENTER | Age: 53
End: 2023-02-02
Payer: COMMERCIAL

## 2023-02-02 VITALS
OXYGEN SATURATION: 97 % | TEMPERATURE: 97.3 F | RESPIRATION RATE: 18 BRPM | DIASTOLIC BLOOD PRESSURE: 93 MMHG | WEIGHT: 223 LBS | HEIGHT: 67 IN | BODY MASS INDEX: 35 KG/M2 | SYSTOLIC BLOOD PRESSURE: 164 MMHG | HEART RATE: 90 BPM

## 2023-02-02 DIAGNOSIS — E11.42 DIABETIC PERIPHERAL NEUROPATHY: ICD-10-CM

## 2023-02-02 DIAGNOSIS — M54.16 LUMBAR RADICULOPATHY: ICD-10-CM

## 2023-02-02 DIAGNOSIS — M51.36 DDD (DEGENERATIVE DISC DISEASE), LUMBAR: Primary | ICD-10-CM

## 2023-02-02 DIAGNOSIS — M51.36 DDD (DEGENERATIVE DISC DISEASE), LUMBAR: ICD-10-CM

## 2023-02-02 DIAGNOSIS — Z41.9 SURGERY, ELECTIVE: Primary | ICD-10-CM

## 2023-02-02 DIAGNOSIS — G89.4 CHRONIC PAIN SYNDROME: Primary | ICD-10-CM

## 2023-02-02 DIAGNOSIS — M54.2 CERVICALGIA: ICD-10-CM

## 2023-02-02 PROCEDURE — 99214 OFFICE O/P EST MOD 30 MIN: CPT | Performed by: NURSE PRACTITIONER

## 2023-02-02 RX ORDER — SODIUM CHLORIDE 0.9 % (FLUSH) 0.9 %
10 SYRINGE (ML) INJECTION AS NEEDED
Status: CANCELLED | OUTPATIENT
Start: 2023-02-02

## 2023-02-02 RX ORDER — ATORVASTATIN CALCIUM 80 MG/1
80 TABLET, FILM COATED ORAL DAILY
COMMUNITY
Start: 2023-01-20

## 2023-02-02 RX ORDER — SEMAGLUTIDE 1.34 MG/ML
4 INJECTION, SOLUTION SUBCUTANEOUS
COMMUNITY
Start: 2023-01-20

## 2023-02-02 RX ORDER — LISINOPRIL 20 MG/1
20 TABLET ORAL DAILY
COMMUNITY
Start: 2023-01-20 | End: 2024-01-20

## 2023-02-02 RX ORDER — OMEPRAZOLE 40 MG/1
40 CAPSULE, DELAYED RELEASE ORAL DAILY
COMMUNITY
Start: 2022-12-31

## 2023-02-02 RX ORDER — AMITRIPTYLINE HYDROCHLORIDE 75 MG/1
75 TABLET, FILM COATED ORAL DAILY
COMMUNITY
Start: 2022-10-14

## 2023-02-02 RX ORDER — GABAPENTIN 800 MG/1
800 TABLET ORAL 3 TIMES DAILY
Qty: 90 TABLET | Refills: 1 | Status: SHIPPED | OUTPATIENT
Start: 2023-02-02 | End: 2023-04-06 | Stop reason: ALTCHOICE

## 2023-02-02 RX ORDER — SODIUM CHLORIDE 0.9 % (FLUSH) 0.9 %
10 SYRINGE (ML) INJECTION EVERY 12 HOURS SCHEDULED
Status: CANCELLED | OUTPATIENT
Start: 2023-02-02

## 2023-02-02 NOTE — H&P (VIEW-ONLY)
CHIEF COMPLAINT  Back pain  Patient in office to discuss LUMBAR MRI results . Reports his lumbar pain continues to remain consistent .    Subjective   Carlos Reese is a 52 y.o. male  who presents for follow-up.  He has a history of back pain, neck pain.  Pain today 6/10 VAS.  Location of his most severe pain complaint is across the low back and radiates into both buttocks and down the legs posteriorly.  Reports that his legs feel weak.  Both feet are numb (has neuropathy).  His pain is aggravated by lifting, twisting, bending.  He continues to utilize Gabapentin 600 mg 2-3/day, Flexeril 10 mg bid PRN.  These do help some.  Denies adverse reactions.        Patient was last seen in clinic 9/7/2022.  He was reporting worsening back and neck pain.  He underwent bilateral L2-L5 radiofrequency ablation in 2020 with minimal relief.  Reports remote history of interlaminar epidural steroid injections (lumbar), recalls little benefit with these.  He has had recent physical therapy/PT guided home exercise program.  His pain continues to remain quite severe.  He was evaluated by neurosurgery in 2020 and 2022, no surgical recommendations at that time.  He presents today to review an updated lumbar MRI.      Patient went to [facility] physical therapy from 8/8/2022 until 9/1/2022.  He was discharged by the physical therapist due to meeting a maximal medical improvement/plateau of progress. The patient has continued to participate in a guided home exercise program specifically taught to him by the physical therapist.  He has been doing this from 9/1/2022 through present.  He participates in this 3-5 times weekly.  He is unable to tolerate additional formal therapy at this time due to the severity of his pain levels.    TIA 6 weeks ago.  Not currently on a blood thinner.      Back Pain  This is a chronic problem. The current episode started more than 1 year ago. The problem occurs daily. The problem has been waxing and  waning since onset. The pain is present in the lumbar spine. The quality of the pain is described as burning. The pain radiates to the left thigh, right thigh, right foot and left foot. The pain is at a severity of 6/10. The symptoms are aggravated by position, bending and twisting. Associated symptoms include chest pain, headaches, numbness (legs,hands) and weakness (legs). Pertinent negatives include no abdominal pain, dysuria or fever. He has tried analgesics, home exercises, muscle relaxant and walking for the symptoms. The treatment provided mild relief.      PEG Assessment   What number best describes your pain on average in the past week?8  What number best describes how, during the past week, pain has interfered with your enjoyment of life?9  What number best describes how, during the past week, pain has interfered with your general activity?  9    Review of Pertinent Medical Data ---  MRI LUMBAR      The following portions of the patient's history were reviewed and updated as appropriate: allergies, current medications, past family history, past medical history, past social history, past surgical history and problem list.    Review of Systems   Constitutional: Negative for activity change (limited), fatigue and fever.   HENT: Negative for congestion.    Eyes: Negative for visual disturbance.   Respiratory: Negative for cough and chest tightness.    Cardiovascular: Positive for chest pain.   Gastrointestinal: Positive for diarrhea. Negative for abdominal pain and constipation.   Genitourinary: Negative for difficulty urinating and dysuria.   Musculoskeletal: Positive for back pain.   Neurological: Positive for weakness (legs), numbness (legs,hands) and headaches. Negative for dizziness and light-headedness.   Psychiatric/Behavioral: Positive for agitation and sleep disturbance. Negative for suicidal ideas. The patient is not nervous/anxious.      I have reviewed and confirmed the accuracy of the ROS as  "documented by the MA/LPN/RN JENNA Haider    Vitals:    02/02/23 1030   BP: 164/93   BP Location: Left arm   Patient Position: Sitting   Cuff Size: Large Adult   Pulse: 90   Resp: 18   Temp: 97.3 °F (36.3 °C)   TempSrc: Temporal   SpO2: 97%   Weight: 101 kg (223 lb)   Height: 170.2 cm (67\")   PainSc:   6         Objective   Physical Exam  Vitals and nursing note reviewed.   Constitutional:       General: He is not in acute distress.     Appearance: Normal appearance. He is not ill-appearing.   Pulmonary:      Effort: Pulmonary effort is normal. No respiratory distress.   Musculoskeletal:      Lumbar back: Tenderness and bony tenderness present. Positive right straight leg raise test and positive left straight leg raise test.   Neurological:      Mental Status: He is alert and oriented to person, place, and time.      Motor: Motor function is intact. No weakness.      Gait: Gait normal.   Psychiatric:         Mood and Affect: Mood normal.         Behavior: Behavior normal.             Assessment & Plan   Diagnoses and all orders for this visit:    1. Chronic pain syndrome (Primary)    2. DDD (degenerative disc disease), lumbar    3. Cervicalgia    4. Diabetic peripheral neuropathy (HCC)    Other orders  -     gabapentin (NEURONTIN) 800 MG tablet; Take 1 tablet by mouth 3 (Three) Times a Day.  Dispense: 90 tablet; Refill: 1        --- Bilateral S1 LTFESI     ---  Indications for epidural injection:  Plan is to proceed with epidural at the appropriate level.  If the patient receives significant pain reduction and improvement in function and the plan will be to repeat the epidural when the pain worsens.  If a second epidural provides at least 6 weeks of sustained improvement that includes both pain reduction and improvement in function then an epidural injection could be repeated once again at the same level.  This is a mutual decision between the clinician and the patient that includes discussions including " risks and benefits in detail as well as alternative therapies.  Patient's questions were answered to their satisfaction and to their understanding.  ---    --- Increase Gabapentin to 800 mg tid   --- May consider transition to Lyrica next   --- Consider EMG in the future if not improving after injection and medication adjustment   --- Follow-up for procedure and 4-6 weeks after for office visit          As the clinician, I personally reviewed the EMMA from 2/2/2023 while the patient was in the office today.    Patient remained masked during entire encounter. No cough present. I donned a mask and eye protection throughout entire visit. Prior to donning mask and eye protection, hand hygiene was performed, as well as when it was doffed.  I was closer than 6 feet, but not for an extended period of time. No obvious exposure to any bodily fluids.

## 2023-02-02 NOTE — PROGRESS NOTES
CHIEF COMPLAINT  Back pain  Patient in office to discuss LUMBAR MRI results . Reports his lumbar pain continues to remain consistent .    Subjective   Carlos Reese is a 52 y.o. male  who presents for follow-up.  He has a history of back pain, neck pain.  Pain today 6/10 VAS.  Location of his most severe pain complaint is across the low back and radiates into both buttocks and down the legs posteriorly.  Reports that his legs feel weak.  Both feet are numb (has neuropathy).  His pain is aggravated by lifting, twisting, bending.  He continues to utilize Gabapentin 600 mg 2-3/day, Flexeril 10 mg bid PRN.  These do help some.  Denies adverse reactions.        Patient was last seen in clinic 9/7/2022.  He was reporting worsening back and neck pain.  He underwent bilateral L2-L5 radiofrequency ablation in 2020 with minimal relief.  Reports remote history of interlaminar epidural steroid injections (lumbar), recalls little benefit with these.  He has had recent physical therapy/PT guided home exercise program.  His pain continues to remain quite severe.  He was evaluated by neurosurgery in 2020 and 2022, no surgical recommendations at that time.  He presents today to review an updated lumbar MRI.      Patient went to [facility] physical therapy from 8/8/2022 until 9/1/2022.  He was discharged by the physical therapist due to meeting a maximal medical improvement/plateau of progress. The patient has continued to participate in a guided home exercise program specifically taught to him by the physical therapist.  He has been doing this from 9/1/2022 through present.  He participates in this 3-5 times weekly.  He is unable to tolerate additional formal therapy at this time due to the severity of his pain levels.    TIA 6 weeks ago.  Not currently on a blood thinner.      Back Pain  This is a chronic problem. The current episode started more than 1 year ago. The problem occurs daily. The problem has been waxing and  waning since onset. The pain is present in the lumbar spine. The quality of the pain is described as burning. The pain radiates to the left thigh, right thigh, right foot and left foot. The pain is at a severity of 6/10. The symptoms are aggravated by position, bending and twisting. Associated symptoms include chest pain, headaches, numbness (legs,hands) and weakness (legs). Pertinent negatives include no abdominal pain, dysuria or fever. He has tried analgesics, home exercises, muscle relaxant and walking for the symptoms. The treatment provided mild relief.      PEG Assessment   What number best describes your pain on average in the past week?8  What number best describes how, during the past week, pain has interfered with your enjoyment of life?9  What number best describes how, during the past week, pain has interfered with your general activity?  9    Review of Pertinent Medical Data ---  MRI LUMBAR      The following portions of the patient's history were reviewed and updated as appropriate: allergies, current medications, past family history, past medical history, past social history, past surgical history and problem list.    Review of Systems   Constitutional: Negative for activity change (limited), fatigue and fever.   HENT: Negative for congestion.    Eyes: Negative for visual disturbance.   Respiratory: Negative for cough and chest tightness.    Cardiovascular: Positive for chest pain.   Gastrointestinal: Positive for diarrhea. Negative for abdominal pain and constipation.   Genitourinary: Negative for difficulty urinating and dysuria.   Musculoskeletal: Positive for back pain.   Neurological: Positive for weakness (legs), numbness (legs,hands) and headaches. Negative for dizziness and light-headedness.   Psychiatric/Behavioral: Positive for agitation and sleep disturbance. Negative for suicidal ideas. The patient is not nervous/anxious.      I have reviewed and confirmed the accuracy of the ROS as  "documented by the MA/LPN/RN JENNA Haider    Vitals:    02/02/23 1030   BP: 164/93   BP Location: Left arm   Patient Position: Sitting   Cuff Size: Large Adult   Pulse: 90   Resp: 18   Temp: 97.3 °F (36.3 °C)   TempSrc: Temporal   SpO2: 97%   Weight: 101 kg (223 lb)   Height: 170.2 cm (67\")   PainSc:   6         Objective   Physical Exam  Vitals and nursing note reviewed.   Constitutional:       General: He is not in acute distress.     Appearance: Normal appearance. He is not ill-appearing.   Pulmonary:      Effort: Pulmonary effort is normal. No respiratory distress.   Musculoskeletal:      Lumbar back: Tenderness and bony tenderness present. Positive right straight leg raise test and positive left straight leg raise test.   Neurological:      Mental Status: He is alert and oriented to person, place, and time.      Motor: Motor function is intact. No weakness.      Gait: Gait normal.   Psychiatric:         Mood and Affect: Mood normal.         Behavior: Behavior normal.             Assessment & Plan   Diagnoses and all orders for this visit:    1. Chronic pain syndrome (Primary)    2. DDD (degenerative disc disease), lumbar    3. Cervicalgia    4. Diabetic peripheral neuropathy (HCC)    Other orders  -     gabapentin (NEURONTIN) 800 MG tablet; Take 1 tablet by mouth 3 (Three) Times a Day.  Dispense: 90 tablet; Refill: 1        --- Bilateral S1 LTFESI     ---  Indications for epidural injection:  Plan is to proceed with epidural at the appropriate level.  If the patient receives significant pain reduction and improvement in function and the plan will be to repeat the epidural when the pain worsens.  If a second epidural provides at least 6 weeks of sustained improvement that includes both pain reduction and improvement in function then an epidural injection could be repeated once again at the same level.  This is a mutual decision between the clinician and the patient that includes discussions including " risks and benefits in detail as well as alternative therapies.  Patient's questions were answered to their satisfaction and to their understanding.  ---    --- Increase Gabapentin to 800 mg tid   --- May consider transition to Lyrica next   --- Consider EMG in the future if not improving after injection and medication adjustment   --- Follow-up for procedure and 4-6 weeks after for office visit          As the clinician, I personally reviewed the EMMA from 2/2/2023 while the patient was in the office today.    Patient remained masked during entire encounter. No cough present. I donned a mask and eye protection throughout entire visit. Prior to donning mask and eye protection, hand hygiene was performed, as well as when it was doffed.  I was closer than 6 feet, but not for an extended period of time. No obvious exposure to any bodily fluids.

## 2023-02-14 NOTE — DISCHARGE INSTRUCTIONS
[facility] - Post-procedure Instructions          --  While there are no absolute restrictions, it is recommended that you do not perform strenuous activity today. In the morning, you may resume your level of activity as before your block.    --  If you have a band-aid at your injection site, please remove it later today. Observe the area for any redness, swelling, pus-like drainage, or a temperature over 101°. If any of these symptoms occur, please call your doctor at [phone]. If after office hours, leave a message and the on-call provider will return your call.    --  Ice may be applied to your injection site. It is recommended you avoid direct heat (heating pad; hot tub) for 1-2 days.    --  Call [facility] at [phone] if you experience persistent headache, persistent bleeding from the injection site, or severe pain not relieved by heat or oral medication.    --  Do not make important decisions today.    --  Due to the effects of the block and/or the I.V. Sedation, DO NOT drive or operate hazardous machinery for 12 hours.  Local anesthetics may cause numbness after procedure and precautions must be taken with regards to operating equipment as well as with walking, even if ambulating with assistance of another person or with an assistive device.    --  Do not drink alcohol for 12 hours.    -- You may return to work tomorrow, or as directed by your referring doctor.    --  Occasionally you may notice a slight increase in your pain after the procedure. This should start to improve within the next 24-48 hours. Radiofrequency ablation procedure pain may last 3-4 weeks.    --  It may take as long as 3-4 days before you notice a gradual improvement in your pain and/or other symptoms.    -- You may continue to take your prescribed pain medication as needed.    --  Some normal possible side effects of steroid use could include fluid retention, increased blood sugar, dull headache,  increased sweating, increased appetite, mood swings and flushing.    --  Diabetics are recommended to watch their blood glucose level closely for 24-48 hours after the injection.    --  Must stay in PACU for 20 min upon arrival and prove no leg weakness before being discharged.    --  IN THE EVENT OF A LIFE THREATENING EMERGENCY, (CHEST PAIN, BREATHING DIFFICULTIES, PARALYSIS…) YOU SHOULD GO TO YOUR NEAREST EMERGENCY ROOM.    --  You should be contacted by our office within 2-3 days to schedule follow up or next appointment date.  If not contacted within 7 days, please call the office at [phone]

## 2023-02-15 ENCOUNTER — HOSPITAL ENCOUNTER (OUTPATIENT)
Facility: SURGERY CENTER | Age: 53
Setting detail: HOSPITAL OUTPATIENT SURGERY
Discharge: HOME | End: 2023-02-15
Attending: ANESTHESIOLOGY | Admitting: ANESTHESIOLOGY
Payer: COMMERCIAL

## 2023-02-15 ENCOUNTER — HOSPITAL ENCOUNTER (OUTPATIENT)
Dept: GENERAL RADIOLOGY | Facility: SURGERY CENTER | Age: 53
Setting detail: HOSPITAL OUTPATIENT SURGERY
End: 2023-02-15
Payer: COMMERCIAL

## 2023-02-15 VITALS
OXYGEN SATURATION: 93 % | TEMPERATURE: 98.4 F | RESPIRATION RATE: 16 BRPM | SYSTOLIC BLOOD PRESSURE: 141 MMHG | WEIGHT: 233 LBS | BODY MASS INDEX: 36.57 KG/M2 | DIASTOLIC BLOOD PRESSURE: 101 MMHG | HEIGHT: 67 IN | HEART RATE: 90 BPM

## 2023-02-15 DIAGNOSIS — Z41.9 SURGERY, ELECTIVE: ICD-10-CM

## 2023-02-15 DIAGNOSIS — M54.16 LUMBAR RADICULOPATHY: ICD-10-CM

## 2023-02-15 DIAGNOSIS — M51.36 DDD (DEGENERATIVE DISC DISEASE), LUMBAR: ICD-10-CM

## 2023-02-15 LAB — GLUCOSE BLDC GLUCOMTR-MCNC: 205 MG/DL (ref 70–130)

## 2023-02-15 PROCEDURE — 76000 FLUOROSCOPY <1 HR PHYS/QHP: CPT

## 2023-02-15 PROCEDURE — 25010000002 IOPAMIDOL 61 % SOLUTION 30 ML VIAL: Performed by: ANESTHESIOLOGY

## 2023-02-15 PROCEDURE — 64483 NJX AA&/STRD TFRM EPI L/S 1: CPT | Performed by: ANESTHESIOLOGY

## 2023-02-15 PROCEDURE — 25010000002 DEXAMETHASONE SODIUM PHOSPHATE 100 MG/10ML SOLUTION 10 ML VIAL: Performed by: ANESTHESIOLOGY

## 2023-02-15 PROCEDURE — 77002 NEEDLE LOCALIZATION BY XRAY: CPT

## 2023-02-15 PROCEDURE — 25010000002 MIDAZOLAM PER 1 MG: Performed by: ANESTHESIOLOGY

## 2023-02-15 PROCEDURE — 25010000002 FENTANYL CITRATE (PF) 50 MCG/ML SOLUTION: Performed by: ANESTHESIOLOGY

## 2023-02-15 RX ORDER — ASPIRIN 81 MG/1
81 TABLET ORAL DAILY
COMMUNITY
Start: 2023-02-02 | End: 2024-02-02

## 2023-02-15 RX ORDER — ALBUTEROL SULFATE 90 UG/1
AEROSOL, METERED RESPIRATORY (INHALATION)
COMMUNITY
Start: 2022-04-04

## 2023-02-15 RX ORDER — MIDAZOLAM HYDROCHLORIDE 1 MG/ML
INJECTION INTRAMUSCULAR; INTRAVENOUS AS NEEDED
Status: DISCONTINUED | OUTPATIENT
Start: 2023-02-15 | End: 2023-02-15 | Stop reason: HOSPADM

## 2023-02-15 RX ORDER — SODIUM CHLORIDE 0.9 % (FLUSH) 0.9 %
10 SYRINGE (ML) INJECTION AS NEEDED
Status: DISCONTINUED | OUTPATIENT
Start: 2023-02-15 | End: 2023-02-15 | Stop reason: HOSPADM

## 2023-02-15 RX ORDER — SODIUM CHLORIDE 0.9 % (FLUSH) 0.9 %
10 SYRINGE (ML) INJECTION EVERY 12 HOURS SCHEDULED
Status: DISCONTINUED | OUTPATIENT
Start: 2023-02-15 | End: 2023-02-15 | Stop reason: HOSPADM

## 2023-02-15 RX ORDER — ALBUTEROL SULFATE 1.25 MG/3ML
1.25 SOLUTION RESPIRATORY (INHALATION)
COMMUNITY
Start: 2022-04-04

## 2023-02-15 RX ORDER — MELOXICAM 15 MG/1
TABLET ORAL
COMMUNITY
Start: 2023-01-22

## 2023-02-15 RX ORDER — FENTANYL CITRATE 50 UG/ML
INJECTION, SOLUTION INTRAMUSCULAR; INTRAVENOUS AS NEEDED
Status: DISCONTINUED | OUTPATIENT
Start: 2023-02-15 | End: 2023-02-15 | Stop reason: HOSPADM

## 2023-02-15 NOTE — OP NOTE
Transforaminal Epidural Steroid Injection Bilateral S1 Levels  [facility]    PREOPERATIVE DIAGNOSIS:  Lumbosacral radicular pain, Lumbar Degenerative Disc Disease and Lumbar Radiculopathy    POSTOPERATIVE DIAGNOSIS:  Same as preop diagnosis    PROCEDURE:    1. CPT 15589 --  Diagnostic & Therapeutic Transforaminal Epidural Steroid Injection at the S1 level, on the bilateral     PRE-PROCEDURE DISCUSSION WITH PATIENT:    Risks and complications were discussed with the patient prior to starting the procedure and informed consent was obtained.  We discussed various topics including but not limited to bleeding, infection, injury, nerve injury, paralysis, coma, death, postprocedural painful flare-up, postprocedural site soreness, and a lack of pain relief.  We discussed the diagnostic aspect of transforaminal epidural / selective nerve root blockade.    SURGEON:  Katerina Abraham MD    SEDATION:  Light sedation was used for this procedure which included and Versed 2mg & Fentanyl 50mcg  ANESTHETIC:  0.5% bupivacaine  STEROID:  15mg dexamethasone    DESCRIPTON OF PROCEDURE:  After obtaining informed consent, an I.V. was started in the preoperative area. The patient taken to the operating room and was placed in the prone position with a pillow under the abdomen.  All pressure points were well padded.  EKG, blood pressure, and pulse oximeter were monitored.  The lumbar area was prepped with Chloraprep and draped in a sterile fashion.     The AP fluoroscopic image was used to visualize the right S1 foramen.  The skin and subcutaneous tissue was anesthetized with 1% lidocaine. A 22-gauge spinal needle was then advanced percutaneously through the anesthetized skin tract under fluoroscopic guidance in AP and lateral views until the needle tip lie in the elder-lateral aspect of the epidural space.  After negative aspiration of the needle for blood or CSF, a volume of 1 mL of Isovue 61% was injected producing  good epidural spread with no evidence of loculation, vascular run-off, or intrathecal spread. Subsequently, a volume of 3 mL of injectate was administered without resistance.  The needle was removed intact.  Vital signs were stable throughout.      The same procedure was then performed on the contralateral side in the exact same fashion.      The total volume injected consisted of 15 mg of dexamethasone with 1 mL of 0.5% bupivacaine and preservative-free normal saline.       ESTIMATED BLOOD LOSS:  <5 mL  SPECIMENS:  none    COMPLICATIONS:   No complications were noted., There was no indication of vascular uptake on live injection of contrast dye. and There was no indication of intrathecal uptake on live injection of contrast dye.    TOLERANCE & DISCHARGE CONDITION:    The patient tolerated the procedure well.  The patient was transported to the recovery area without difficulties.  The patient was discharged to home under the care of family in stable and satisfactory condition.    PLAN OF CARE:  1. The patient was given our standard instruction sheet.  2. The patient will Return to clinic 4-6 wks.  3. The patient will resume all medications as per the medication reconciliation sheet.

## 2023-04-06 ENCOUNTER — OFFICE VISIT (OUTPATIENT)
Dept: PAIN MEDICINE | Facility: CLINIC | Age: 53
End: 2023-04-06
Payer: COMMERCIAL

## 2023-04-06 VITALS
HEART RATE: 90 BPM | SYSTOLIC BLOOD PRESSURE: 133 MMHG | BODY MASS INDEX: 35.88 KG/M2 | HEIGHT: 67 IN | OXYGEN SATURATION: 95 % | WEIGHT: 228.6 LBS | DIASTOLIC BLOOD PRESSURE: 74 MMHG | TEMPERATURE: 98.4 F

## 2023-04-06 DIAGNOSIS — G89.4 CHRONIC PAIN SYNDROME: Primary | ICD-10-CM

## 2023-04-06 DIAGNOSIS — R20.2 PARESTHESIA OF BILATERAL LEGS: ICD-10-CM

## 2023-04-06 DIAGNOSIS — E11.42 DIABETIC PERIPHERAL NEUROPATHY: ICD-10-CM

## 2023-04-06 DIAGNOSIS — M51.36 DDD (DEGENERATIVE DISC DISEASE), LUMBAR: ICD-10-CM

## 2023-04-06 DIAGNOSIS — M47.816 LUMBAR FACET JOINT SYNDROME: ICD-10-CM

## 2023-04-06 PROCEDURE — 1160F RVW MEDS BY RX/DR IN RCRD: CPT | Performed by: NURSE PRACTITIONER

## 2023-04-06 PROCEDURE — 1159F MED LIST DOCD IN RCRD: CPT | Performed by: NURSE PRACTITIONER

## 2023-04-06 PROCEDURE — 99214 OFFICE O/P EST MOD 30 MIN: CPT | Performed by: NURSE PRACTITIONER

## 2023-04-06 PROCEDURE — 1125F AMNT PAIN NOTED PAIN PRSNT: CPT | Performed by: NURSE PRACTITIONER

## 2023-04-06 RX ORDER — EZETIMIBE 10 MG/1
10 TABLET ORAL DAILY
Qty: 30 TABLET | Refills: 11 | COMMUNITY
Start: 2023-02-27 | End: 2024-02-27

## 2023-04-06 RX ORDER — METOPROLOL SUCCINATE 25 MG/1
25 TABLET, EXTENDED RELEASE ORAL DAILY
Qty: 30 TABLET | Refills: 11 | COMMUNITY
Start: 2023-02-27 | End: 2024-02-27

## 2023-04-06 RX ORDER — ISOSORBIDE MONONITRATE 30 MG/1
30 TABLET, EXTENDED RELEASE ORAL DAILY
Qty: 30 TABLET | Refills: 11 | COMMUNITY
Start: 2023-02-27 | End: 2024-02-27

## 2023-04-06 RX ORDER — GABAPENTIN 800 MG/1
TABLET ORAL
Qty: 90 TABLET | Refills: 1 | OUTPATIENT
Start: 2023-04-06

## 2023-04-06 RX ORDER — PREGABALIN 150 MG/1
150 CAPSULE ORAL 2 TIMES DAILY
Qty: 60 CAPSULE | Refills: 1 | Status: SHIPPED | OUTPATIENT
Start: 2023-04-06

## 2023-04-06 NOTE — PROGRESS NOTES
CHIEF COMPLAINT  F/U back pain-bilateral S1 lumbar transforaminal epidural steroid injection-  Patient states that he had 50% improvement for 2 weeks.     Subjective   Carlos Reese is a 52 y.o. male  who presents to the office for follow-up of procedure.  He completed a bilateral S1   on  2/15/2023 performed by Dr. Abraham for management of back pain, leg pain. Patient reports 50% relief from the procedure for 1-2 weeks      Pain today 5/10 VAS.  Location of his most severe pain complaint is across the low back and radiates into both buttocks and down the legs posteriorly.  Reports that his legs feel weak.  Both feet are numb (has neuropathy).  His pain is aggravated by lifting, twisting, bending.  He continues to utilize Gabapentin 800 mg 2-3/day, Flexeril 10 mg bid PRN.  These do help some, minimally.  Denies adverse reactions.        He underwent bilateral L2-L5 radiofrequency ablation in 2020 with minimal relief.  Reports remote history of interlaminar epidural steroid injections (lumbar), recalls little benefit with these.  He has had recent physical therapy/PT guided home exercise program.  His pain continues to remain quite severe.  He was evaluated by neurosurgery in 2020 and 2022, no surgical recommendations at that time.  He presents today to review an updated lumbar MRI.       Patient went to [facility] physical therapy from 8/8/2022 until 9/1/2022.  He was discharged by the physical therapist due to meeting a maximal medical improvement/plateau of progress. The patient has continued to participate in a guided home exercise program specifically taught to him by the physical therapist.  He has been doing this from 9/1/2022 through present.  He participates in this 3-5 times weekly.  He is unable to tolerate additional formal therapy at this time due to the severity of his pain levels.    Back Pain  This is a chronic problem. The current episode started more than 1 year ago. The problem occurs daily.  Andrea Agarwal(Resident) The problem has been waxing and waning since onset. The pain is present in the lumbar spine. The quality of the pain is described as burning. The pain radiates to the left thigh, right thigh, right foot and left foot. The pain is at a severity of 5/10. The symptoms are aggravated by position, bending and twisting. Associated symptoms include headaches, numbness (lower extremities) and weakness (lower extremities). Pertinent negatives include no abdominal pain, chest pain, dysuria or fever. He has tried analgesics, home exercises, muscle relaxant and walking for the symptoms. The treatment provided mild relief.      PEG Assessment   What number best describes your pain on average in the past week?7  What number best describes how, during the past week, pain has interfered with your enjoyment of life?7  What number best describes how, during the past week, pain has interfered with your general activity?  6    The following portions of the patient's history were reviewed and updated as appropriate: allergies, current medications, past family history, past medical history, past social history, past surgical history and problem list.    Review of Systems   Constitutional: Positive for activity change (decreased). Negative for chills, fatigue and fever.   HENT: Negative for congestion.    Eyes: Negative for visual disturbance.   Respiratory: Negative for chest tightness and shortness of breath.    Cardiovascular: Negative for chest pain.   Gastrointestinal: Negative for abdominal pain, constipation and diarrhea.   Genitourinary: Negative for difficulty urinating and dysuria.   Musculoskeletal: Positive for back pain.   Neurological: Positive for dizziness, weakness (lower extremities), numbness (lower extremities) and headaches. Negative for light-headedness.   Psychiatric/Behavioral: Positive for sleep disturbance. Negative for agitation. The patient is not nervous/anxious.      I have reviewed and confirmed the accuracy  "of the ROS as documented by the MA/LPN/RN Shahidayecenia Gutierrez APRMAYA     Vitals:    04/06/23 1446   BP: 133/74   Pulse: 90   Temp: 98.4 °F (36.9 °C)   SpO2: 95%   Weight: 104 kg (228 lb 9.6 oz)   Height: 170.2 cm (67\")   PainSc:   5   PainLoc: Back         Objective   Physical Exam  Vitals and nursing note reviewed.   Constitutional:       General: He is not in acute distress.     Appearance: Normal appearance. He is not ill-appearing.   Pulmonary:      Effort: Pulmonary effort is normal. No respiratory distress.   Musculoskeletal:      Lumbar back: Tenderness and bony tenderness present. Negative right straight leg raise test and negative left straight leg raise test.   Neurological:      Mental Status: He is alert and oriented to person, place, and time.      Motor: Motor function is intact. No weakness.      Gait: Gait abnormal (slowed).   Psychiatric:         Mood and Affect: Mood normal.         Behavior: Behavior normal.       Assessment & Plan   Diagnoses and all orders for this visit:    1. Chronic pain syndrome (Primary)    2. DDD (degenerative disc disease), lumbar    3. Diabetic peripheral neuropathy  -     pregabalin (LYRICA) 150 MG capsule; Take 1 capsule by mouth 2 (Two) Times a Day.  Dispense: 60 capsule; Refill: 1    4. Lumbar facet joint syndrome    5. Paresthesia of bilateral legs  -     EMG & Nerve Conduction Test; Future      --- EMG/Nerve conduction study   --- D/c Gabapentin. Trial Lyrica. Start at 150 mg bid, can increase to TID after 1-2 weeks if needed.  Discussed medication with the patient.  Included in this discussion was the potential for side effects and adverse events.  Patient verbalized understanding and wished to proceed.  Prescription will be sent to pharmacy.   --- SCS could be a long term consideration. Would need to discuss with Dr. Abraham.    --- Follow-up 1 month            As the clinician, I personally reviewed the EMMA from 4/6/2023 while the patient was in the office " today.    Dictated utilizing Dragon dictation.

## 2023-04-13 ENCOUNTER — TELEPHONE (OUTPATIENT)
Dept: NEUROLOGY | Facility: CLINIC | Age: 53
End: 2023-04-13
Payer: COMMERCIAL

## 2023-04-13 ENCOUNTER — HOSPITAL ENCOUNTER (OUTPATIENT)
Dept: INFUSION THERAPY | Facility: HOSPITAL | Age: 53
Discharge: HOME | End: 2023-04-13
Admitting: PSYCHIATRY & NEUROLOGY
Payer: COMMERCIAL

## 2023-04-13 DIAGNOSIS — R20.2 PARESTHESIA OF BILATERAL LEGS: ICD-10-CM

## 2023-04-13 PROCEDURE — 95909 NRV CNDJ TST 5-6 STUDIES: CPT

## 2023-04-13 PROCEDURE — 95909 NRV CNDJ TST 5-6 STUDIES: CPT | Performed by: PSYCHIATRY & NEUROLOGY

## 2023-04-13 PROCEDURE — 95886 MUSC TEST DONE W/N TEST COMP: CPT | Performed by: PSYCHIATRY & NEUROLOGY

## 2023-04-13 PROCEDURE — 95886 MUSC TEST DONE W/N TEST COMP: CPT

## 2023-04-13 NOTE — PROCEDURES
EMG and Nerve Conduction Studies    I.      Instrument used: Neuromax 1002  II.     Please see data sheets for tabular summary of NCS and details on methods, temperatures and lab standards.   III.    EMG muscles tested for upper extremity studies include the deltoid, biceps, triceps, pronator teres, extensor digitorum communis, first dorsal interosseous and abductor pollicis brevis.    IV.   EMG muscles tested for lower extremity studies include the vastus lateralis, tibialis anterior, peroneus longus, medial gastrocnemius and extensor digitorum brevis.    V.    Additional muscles tested as needed.  Paraspinal muscles tested as needed.   VI.   Please see data sheets for tabular summary of EMG findings.   VII. The complete report includes the data sheets.      Indication: Numbness in the hands and feet for several years getting progressively worse.  History of diabetes treated for about 4 years.  History of apparent frostbite in his feet years ago.  History: 52-year-old male with diabetes who has chronic low back pain treated by pain management who has numbness in his feet and legs for years getting progressively worse.  His hands are also involved.  There is question of frostbite years ago in the feet.      Ht: 170.2 cm  Wt: 102 kg; BMI 35.8  HbA1C:   Lab Results   Component Value Date    HGBA1C 7.9 (H) 01/20/2023     TSH:   Lab Results   Component Value Date    TSH 2.160 07/08/2022       Technical summary:  Nerve conduction studies were obtained in the right leg with 1 comparison on the left.  Skin temperatures were at least 32 °C measured at the ankles.  Needle examination was obtained on selected muscles in both legs.    Results:  1.  Absent right sural sensory potential.  2.  Absent right superficial peroneal sensory potential.   Prolonged left superficial peroneal sensory distal latency at 4.8 ms with low amplitude of 3.7 µV.  3.  Very slow right peroneal motor velocity below the knee at 23.8 m/s with slow  velocity in the short segment across the fibular head at 34.3 m/s.  Normal distal latency with very low amplitude of 0.333 mV from ankle stimulation.  4.  Slow right tibial motor velocity at 34.9 m/s with a prolonged latency of 7.3 ms.  Very low amplitude of 0.267 mV from ankle stimulation.  5.  Needle examination of selected muscles in both legs showed fibrillations and positive sharp waves in the tibialis anterior, peroneus longus and medial gastrocnemius muscles bilaterally with an increased number of large motor units increased firing rates and reduced interference patterns.  The vastus lateralis muscles showed positive sharp waves bilaterally with an increased number of large motor units increased firing rate and reduced interference pattern.  The extensor digitorum brevis muscles showed normal insertional activity versus no insertional activity with no motor units on either side.  Lumbar paraspinals at L5 showed no abnormality on either side.    Impression:  Severely abnormal study showing severe peripheral polyneuropathy with severe axon loss/denervation present.  Although I cannot entirely exclude a cauda equina syndrome there were no needle exam changes in the paraspinals to demonstrate root level involvement.  Clinical correlation suggested.    Aureliano Padron M.D.              Dictated utilizing Dragon dictation.

## 2023-04-14 NOTE — PROGRESS NOTES
Patient informed. Patient states that he has not received the medication because the pharmacy stated that he needed a PA. The PA was performed on the 8th and the response was that no PA was needed. I called the pharmacy and she stated that the Rx went through. Informed the patient that he can  his medication today. I asked him to call us in a week and let us know how he is doing.

## 2023-05-11 ENCOUNTER — OFFICE VISIT (OUTPATIENT)
Dept: PAIN MEDICINE | Facility: CLINIC | Age: 53
End: 2023-05-11
Payer: COMMERCIAL

## 2023-05-11 VITALS
DIASTOLIC BLOOD PRESSURE: 69 MMHG | BODY MASS INDEX: 35.03 KG/M2 | HEIGHT: 67 IN | SYSTOLIC BLOOD PRESSURE: 112 MMHG | HEART RATE: 94 BPM | TEMPERATURE: 97.7 F | WEIGHT: 223.2 LBS | OXYGEN SATURATION: 96 %

## 2023-05-11 DIAGNOSIS — M51.36 DDD (DEGENERATIVE DISC DISEASE), LUMBAR: ICD-10-CM

## 2023-05-11 DIAGNOSIS — E11.42 DIABETIC PERIPHERAL NEUROPATHY: ICD-10-CM

## 2023-05-11 DIAGNOSIS — G89.4 CHRONIC PAIN SYNDROME: Primary | ICD-10-CM

## 2023-05-11 PROCEDURE — 1160F RVW MEDS BY RX/DR IN RCRD: CPT | Performed by: NURSE PRACTITIONER

## 2023-05-11 PROCEDURE — 1159F MED LIST DOCD IN RCRD: CPT | Performed by: NURSE PRACTITIONER

## 2023-05-11 PROCEDURE — 1125F AMNT PAIN NOTED PAIN PRSNT: CPT | Performed by: NURSE PRACTITIONER

## 2023-05-11 PROCEDURE — 99214 OFFICE O/P EST MOD 30 MIN: CPT | Performed by: NURSE PRACTITIONER

## 2023-05-11 RX ORDER — PREGABALIN 150 MG/1
150 CAPSULE ORAL 3 TIMES DAILY
Qty: 90 CAPSULE | Refills: 1 | Status: SHIPPED | OUTPATIENT
Start: 2023-05-11

## 2023-05-11 RX ORDER — TAMSULOSIN HYDROCHLORIDE 0.4 MG/1
0.4 CAPSULE ORAL DAILY
COMMUNITY
Start: 2023-04-27

## 2023-05-11 NOTE — PROGRESS NOTES
CHIEF COMPLAINT  F/U back pain- patient states that his pain hsa remained the same since his last visit.     Subjective   Carlos Reese is a 52 y.o. male  who presents for follow-up.  He has a history of back pain, bilateral LE pain.  He has had an EMG since last office visit.  It shows severe peripheral neuropathy.    A1C is trending down.  Last was 7.9.      Pain today 6/10 VAS.  Severe back and lower extremity pain.  Transitioned from Gabapentin to Lyrica last visit. Currently taking Lyrica 150 mg 2-3/day. He cannot tell much of a difference since changing medication. No adverse reactions.      Procedures ---   bilateral S1   on  2/15/2023 --- 50% relief from the procedure for 1-2 weeks    bilateral L2-L5 radiofrequency ablation in 2020 with minimal relief  Reports remote history of interlaminar epidural steroid injections (lumbar), recalls little benefit with these    He was evaluated by neurosurgery in 2020 and 2022, no surgical recommendations at that time.         Patient went to [facility] physical therapy from 8/8/2022 until 9/1/2022.  He was discharged by the physical therapist due to meeting a maximal medical improvement/plateau of progress. The patient has continued to participate in a guided home exercise program specifically taught to him by the physical therapist.  He has been doing this from 9/1/2022 through present.  He participates in this 3-5 times weekly.  He is unable to tolerate additional formal therapy at this time due to the severity of his pain levels.    Back Pain  This is a chronic problem. The current episode started more than 1 year ago. The problem occurs daily. The problem has been waxing and waning since onset. The pain is present in the lumbar spine. The quality of the pain is described as burning. The pain radiates to the left thigh, right thigh, right foot and left foot. The pain is at a severity of 6/10. The symptoms are aggravated by position, bending and twisting.  Associated symptoms include leg pain, numbness (margarito legs) and weakness (margarito legs). Pertinent negatives include no abdominal pain, chest pain, dysuria, fever or headaches. He has tried analgesics, home exercises, muscle relaxant and walking for the symptoms. The treatment provided mild relief.   Leg Pain   The pain is present in the left leg, left foot, right foot and right leg. The quality of the pain is described as burning. The pain is at a severity of 6/10. The pain has been constant since onset. Associated symptoms include numbness (margarito legs). The symptoms are aggravated by movement and weight bearing. He has tried acetaminophen, rest and elevation for the symptoms. The treatment provided mild relief.     PEG Assessment   What number best describes your pain on average in the past week?7  What number best describes how, during the past week, pain has interfered with your enjoyment of life?6  What number best describes how, during the past week, pain has interfered with your general activity?  6    Review of Pertinent Medical Data ---      The following portions of the patient's history were reviewed and updated as appropriate: allergies, current medications, past family history, past medical history, past social history, past surgical history and problem list.    Review of Systems   Constitutional: Negative for activity change, chills, fatigue and fever.   HENT: Negative for congestion.    Eyes: Negative for visual disturbance.   Respiratory: Negative for chest tightness and shortness of breath.    Cardiovascular: Negative for chest pain.   Gastrointestinal: Negative for abdominal pain, constipation and diarrhea.   Genitourinary: Negative for difficulty urinating and dysuria.   Musculoskeletal: Positive for back pain.   Neurological: Positive for dizziness, weakness (margarito legs), light-headedness and numbness (margarito legs). Negative for headaches.   Psychiatric/Behavioral: Positive for sleep disturbance. Negative for  "agitation. The patient is not nervous/anxious.      I have reviewed and confirmed the accuracy of the ROS as documented by the MA/LPN/RN JENNA Haider    Vitals:    05/11/23 1242   BP: 112/69   Pulse: 94   Temp: 97.7 °F (36.5 °C)   SpO2: 96%   Weight: 101 kg (223 lb 3.2 oz)   Height: 170.2 cm (67\")   PainSc:   6   PainLoc: Back         Objective   Physical Exam  Vitals and nursing note reviewed.   Constitutional:       General: He is not in acute distress.     Appearance: Normal appearance. He is not ill-appearing.   Pulmonary:      Effort: Pulmonary effort is normal. No respiratory distress.   Musculoskeletal:      Lumbar back: Tenderness and bony tenderness present. Negative right straight leg raise test and negative left straight leg raise test.   Neurological:      Mental Status: He is alert and oriented to person, place, and time.      Motor: Motor function is intact. No weakness.      Gait: Gait abnormal (slowed).   Psychiatric:         Mood and Affect: Mood normal.         Behavior: Behavior normal.       Assessment & Plan   Diagnoses and all orders for this visit:    1. Chronic pain syndrome (Primary)    2. DDD (degenerative disc disease), lumbar    3. Diabetic peripheral neuropathy  -     Ambulatory Referral to Psychology  -     pregabalin (LYRICA) 150 MG capsule; Take 1 capsule by mouth 3 (Three) Times a Day.  Dispense: 90 capsule; Refill: 1      --- Continue Lyrica. Encouraged to take tid as tolerated.      ----------  Education about SCS therapy:    -  This was an extended office visit in which we entered into discussion about advanced pain relieving techniques, and discussed implantable pain therapies.  We discussed advanced neuromodulation in the form of Spinal Cord Stimulation.  This is a reasonable therapy for patients who have exhausted basic nonnarcotic options, basic modalities and physical therapies, and do not have any other reasonable surgical options.  This therapy as an alternative " to long term high dose opioid therapy.    -  Risks include but are not limited to bleeding, infection, injury, paralysis, nerve injury, dural puncture, and risk for postprocedural pain.  Implanted equipment risks include but are not limited to lead migration, lead fracture, risk of loss of pain relieving stimulation, risk of electrical shock, and risk of system failure.    - We discussed the theory and basic science behind SCS therapy including but not limited to energy delivery and relevant anatomy, in terms that are easy to understand and also with use of illustrative devices.  Spinal Cord Stimulation therapies apply an electromagnetic field to a specific area on the spinal cord (Dorsal Column) to attempt to block transmission of painful signals from the peripheral nerves to the brain.    -  We discussed that prior to trialing, I request that patients review relevant materials and perform some research, and also have a follow up education session with a device specialist from the .  Also, insurance requires a presurgical psychological evaluation.  When these have been completed, and all the patient's questions have been answered to their satisfaction, then we will plan to request authorization for trialing.   - We discussed the trialing process (aka Phase 1)  that usually lasts a week, and the temporary nature of this trial.  Trial success will determine whether or not we proceed to implant.  We discussed reasonable expectations, and that I feel that consistent 50% pain relief is medically successful and is a reasonable expectation to justify moving forward to permanent implant.    -  Additional risks of Phase 1 include but are not limited to bleeding on insertion, bleeding on lead removal, and procedural site soreness.  - We discussed the percutaneous surgical implant, including postsurgical restrictions, risks, and alternatives.   For spinal cord stimulation implanted device (aka SCS Phase 2) there  is usually a midline vertical incision for the spinally implanted leads, and also a horizontal incision in the posterior lumbar flank for implantation of the battery & computer (aka IPG).  The leads are tunneled from the midline incision to the medial aspect of the battery pocket incision.    -  Postoperative restrictions include limiting the following activity as much as possible for 90 days:  Lifting >10 lbs, bending at the waist, stretching/reaching overhead, and twisting.  ----------    --- Education session with Gema   --- Patient has confirmed severe diabetic peripheral neuropathy.  His leg pain and symptoms have not improved despite significantly improved glucose control as well as maxing out medication options including gabapentin, Lyrica, Amitriptyline.  He has no interested in relying chronically on opioid pain medication.    --- Follow-up for SCS trial if appropriate based on pre-surgical eval          As the clinician, I personally reviewed the EMMA from 5/11/2023 while the patient was in the office today.     Dictated utilizing Dragon dictation.

## 2023-05-16 ENCOUNTER — OFFICE VISIT (OUTPATIENT)
Dept: BEHAVIORAL HEALTH | Facility: CLINIC | Age: 53
End: 2023-05-16
Payer: COMMERCIAL

## 2023-05-16 DIAGNOSIS — M54.16 LUMBAR RADICULOPATHY: Primary | ICD-10-CM
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
## 2023-05-18 ENCOUNTER — PREP FOR SURGERY (OUTPATIENT)
Dept: SURGERY | Facility: SURGERY CENTER | Age: 53
End: 2023-05-18
Payer: COMMERCIAL

## 2023-05-18 DIAGNOSIS — E11.42 DIABETIC PERIPHERAL NEUROPATHY: Primary | ICD-10-CM

## 2023-05-18 RX ORDER — SODIUM CHLORIDE 0.9 % (FLUSH) 0.9 %
10 SYRINGE (ML) INJECTION AS NEEDED
OUTPATIENT
Start: 2023-05-18

## 2023-05-18 RX ORDER — SODIUM CHLORIDE 0.9 % (FLUSH) 0.9 %
10 SYRINGE (ML) INJECTION EVERY 12 HOURS SCHEDULED
OUTPATIENT
Start: 2023-05-18

## 2023-06-01 ENCOUNTER — TELEPHONE (OUTPATIENT)
Dept: PAIN MEDICINE | Facility: CLINIC | Age: 53
End: 2023-06-01

## 2023-06-01 NOTE — TELEPHONE ENCOUNTER
"  Caller: Carlos Reese \"Hansel\"    Relationship: Self    Best call back number:     What was the call regarding: THE PATIENT WOULD LIKE TO KNOW IF HIS INSURANCE HAS APPROVED A SPINAL CORD STIMULATOR     Is it okay if the provider responds through MyChart: YES  "

## 2023-06-06 NOTE — TELEPHONE ENCOUNTER
"    Caller: Carlos Reese \"Hansel\"    Relationship to patient: Self    Best call back number: [phone]    Patient is needing: PATIENT CALLED BACK WANTING STATUS AND THE ENCOUNTER IS STILL OPEN         "

## 2023-07-03 ENCOUNTER — TELEPHONE (OUTPATIENT)
Dept: PAIN MEDICINE | Facility: CLINIC | Age: 53
End: 2023-07-03

## 2023-07-03 NOTE — TELEPHONE ENCOUNTER
"  Caller: Carlos Reese \"Hansel\"    Relationship: Self    Best call back number: [phone] (home)     Who are you requesting to speak with (clinical staff, provider,  specific staff member): NELY CHAMPAGNE    What was the call regarding: THEPATIENT STATED HE GOT A LETTER IN THE MAIL FROM HIS INSURANCE DENYING THE SPINAL CORD STIMULATOR. HE WOULD LIKE TO KNOW WHAT TO DO NOW.    Is it okay if the provider responds through MyChart: NO, CALL PREFERRED        "

## 2023-08-29 ENCOUNTER — TELEPHONE (OUTPATIENT)
Dept: PAIN MEDICINE | Facility: CLINIC | Age: 53
End: 2023-08-29

## 2023-08-29 NOTE — TELEPHONE ENCOUNTER
Provider: IHSAN  Caller: JERROD  Phone Number: [phone]  Reason for Call: PT STATES HE GOT AN AUTH LETTER ABOUT HIS SPINAL STIMULATOR. AND WANTED TO SEE IF WE HAVE GOTTEN THE UPDATE ON THAT AND TO GET SCHEDULE IS POSSIBLE

## 2023-08-31 ENCOUNTER — TRANSCRIBE ORDERS (OUTPATIENT)
Dept: SURGERY | Facility: SURGERY CENTER | Age: 53
End: 2023-08-31
Payer: COMMERCIAL

## 2023-08-31 DIAGNOSIS — Z41.9 SURGERY, ELECTIVE: Primary | ICD-10-CM

## 2023-08-31 PROBLEM — E11.42 DIABETIC PERIPHERAL NEUROPATHY: Status: ACTIVE | Noted: 2023-08-31

## 2023-09-01 ENCOUNTER — OFFICE VISIT (OUTPATIENT)
Dept: PAIN MEDICINE | Facility: CLINIC | Age: 53
End: 2023-09-01
Payer: COMMERCIAL

## 2023-09-01 VITALS
TEMPERATURE: 97.5 F | HEART RATE: 106 BPM | SYSTOLIC BLOOD PRESSURE: 116 MMHG | DIASTOLIC BLOOD PRESSURE: 58 MMHG | WEIGHT: 228.8 LBS | HEIGHT: 67 IN | OXYGEN SATURATION: 96 % | RESPIRATION RATE: 18 BRPM | BODY MASS INDEX: 35.91 KG/M2

## 2023-09-01 DIAGNOSIS — E11.42 DIABETIC PERIPHERAL NEUROPATHY: ICD-10-CM

## 2023-09-01 DIAGNOSIS — Z01.818 PRE-OP TESTING: ICD-10-CM

## 2023-09-01 DIAGNOSIS — G89.4 CHRONIC PAIN SYNDROME: Primary | ICD-10-CM

## 2023-09-01 DIAGNOSIS — M51.36 DDD (DEGENERATIVE DISC DISEASE), LUMBAR: ICD-10-CM

## 2023-09-01 RX ORDER — METOPROLOL SUCCINATE 25 MG/1
12.5 TABLET, EXTENDED RELEASE ORAL DAILY
COMMUNITY
Start: 2023-08-31

## 2023-09-01 NOTE — PROGRESS NOTES
CHIEF COMPLAINT  Follow-up for back pain.    Subjective   Carlos Reese is a 52 y.o. male  who presents for follow-up.  He has a history of back pain, LE pain related to DPN.  We have tried a variety of conservative treatment for both his back pain and neuropathic pain.  This includes both interlaminar and transforaminal epidural injections, radiofrequency ablation, Lyrica and gabapentin treatment.  Overall his pain remains poorly controlled.  He has had a nerve conduction test which shows severe peripheral neuropathy.  Pain continues to be severe to the despite better control of his glucose, A1c continues to trend down.  He has seen neurosurgery on multiple occasions, no surgical indications.  He is scheduled for spinal cord stimulator phase 1 trial with Dr. Abraham on 9/11/2023.  He presents today for preop evaluation.    Back Pain  This is a chronic problem. The current episode started more than 1 year ago. The problem occurs daily. The problem has been waxing and waning since onset. The pain is present in the lumbar spine. The quality of the pain is described as burning. The pain radiates to the left thigh, right thigh, right foot and left foot. The pain is at a severity of 6/10. The symptoms are aggravated by position, bending and twisting. Associated symptoms include leg pain, numbness (bilateral legs) and weakness (bilateral legs). Pertinent negatives include no abdominal pain, chest pain, dysuria, fever or headaches. He has tried analgesics, home exercises, muscle relaxant and walking for the symptoms. The treatment provided mild relief.   Leg Pain   The pain is present in the left leg, left foot, right foot and right leg. The quality of the pain is described as burning. The pain is at a severity of 6/10. The pain has been Constant since onset. Associated symptoms include numbness (bilateral legs). The symptoms are aggravated by movement and weight bearing. He has tried acetaminophen, rest and elevation  "for the symptoms. The treatment provided mild relief.      PEG Assessment   What number best describes your pain on average in the past week?7  What number best describes how, during the past week, pain has interfered with your enjoyment of life?8  What number best describes how, during the past week, pain has interfered with your general activity?  8    Review of Pertinent Medical Data ---    The following portions of the patient's history were reviewed and updated as appropriate: allergies, current medications, past family history, past medical history, past social history, past surgical history, and problem list.    Review of Systems   Constitutional:  Negative for fever.   Cardiovascular:  Negative for chest pain.   Gastrointestinal:  Negative for abdominal pain, constipation and diarrhea.   Genitourinary:  Negative for difficulty urinating and dysuria.   Musculoskeletal:  Positive for back pain.   Neurological:  Positive for weakness (bilateral legs) and numbness (bilateral legs). Negative for headaches.   Psychiatric/Behavioral:  Negative for sleep disturbance and suicidal ideas. The patient is not nervous/anxious.      I have reviewed and confirmed the accuracy of the ROS as documented by the MA/ADAM/RN JENNA Haider    Vitals:    09/01/23 1123   BP: 116/58   Pulse: 106   Resp: 18   Temp: 97.5 øF (36.4 øC)   SpO2: 96%   Weight: 104 kg (228 lb 12.8 oz)   Height: 170.2 cm (67\")   PainSc:   6   PainLoc: Back         Objective   Physical Exam  Vitals and nursing note reviewed.   Constitutional:       General: He is not in acute distress.     Appearance: Normal appearance. He is not ill-appearing.   Pulmonary:      Effort: Pulmonary effort is normal. No respiratory distress.   Musculoskeletal:      Lumbar back: Tenderness and bony tenderness present.   Neurological:      Mental Status: He is alert and oriented to person, place, and time.      Motor: Motor function is intact. No weakness.      Gait: Gait " abnormal (slowed).   Psychiatric:         Mood and Affect: Mood normal.         Behavior: Behavior normal.         Assessment & Plan   Diagnoses and all orders for this visit:    1. Chronic pain syndrome (Primary)    2. Diabetic peripheral neuropathy    3. DDD (degenerative disc disease), lumbar    4. Pre-op testing  -     Basic Metabolic Panel; Future  -     CBC & Differential; Future  -     ECG 12 Lead; Future  -     XR Chest 2 View; Future        Spinal Cord Stimulator Phase 1 Trial Postoperative instructions  --- Keep the dressing on the incision. It will be removed at your appointment in the [facility] in 5-7 days  --- Keep the dressing clean and dry. DO NOT shower or submerge yourself in water (I.e. bath tub, pool, etc.) until after you return to the [facility] Clinic and the leads are removed  ---You should have received information from the representative about your stimulator. Please call the representative with any questions about the stimulator.   --- Please call the [facility] for any signs of infection such as new severe back pain or leg numbness/weakness, redness or drainage from the incision sites, or fever greater then 101 F. Call the clinic if the dressing comes off.   --- Your return appointment in the [facility] should be about 5-7 days from the day of surgery.  If you don't have a return appointment call the [facility] at [phone] to schedule a follow up appointment to remove the dressing and the leads.   --- DO NOT take blood thinners while the stimulator is in place. Confirm with us when you will restart your blood thinners (Based on the instructions given to you by the prescriber of your blood thinners).   --- No heavy lifting >10 pounds. No excessive reaching, bending, or twisting.  Avoid sudden twisting.   --- You can take Tylenol as needed as long as you have no contraindications (liver disease, etc).   --- You may increase the  frequency of your current pain medications for the acute post-operative pain until you are seen in the pain clinic in 5-7 days.  Ask us how much you may increase your pain medications.   --- Take the prescribed antibiotics until you are seen in the [facility] Management Clinic at follow-up.   --- Turn off the stimulator when driving.    [facility] (Monday-Friday, 8:00am-4:30pm)  Ph: [phone]    Post Sedation/Anesthesia Instructions:  Your anesthesia was supervised/directed by a physician anesthesiologist.     The medication you received may make you drowsy.  Your perception, attitude, and reaction time may be affected following sedation or anesthesia.  --- DO NOT drive or operate machinery for 24 hours, or while in severe pain  --- Avoid making critical decisions or signing legal documents for 24 hours  --- DO NOT drink alcoholic beverages for 24 hours and not while taking pain medications.       --- Continue Lyrica   --- sending to lab for pre-op testing     Carlos Reese reports a pain score of 6.  Given his pain assessment as noted, treatment options were discussed and the following options were decided upon as a follow-up plan to address the patient's pain:  see plan above .        --- Follow-up for scs trial and 1 week post for lead pull                  EMMA REPORT    As the clinician, I personally reviewed the EMMA from 9/1/2023 while the patient was in the office today.    History and physical exam exhibit continued safe and appropriate use of controlled substances.       Dictated utilizing Dragon dictation.

## 2023-09-01 NOTE — H&P (VIEW-ONLY)
CHIEF COMPLAINT  Follow-up for back pain.    Subjective   Carlos Reese is a 52 y.o. male  who presents for follow-up.  He has a history of back pain, LE pain related to DPN.  We have tried a variety of conservative treatment for both his back pain and neuropathic pain.  This includes both interlaminar and transforaminal epidural injections, radiofrequency ablation, Lyrica and gabapentin treatment.  Overall his pain remains poorly controlled.  He has had a nerve conduction test which shows severe peripheral neuropathy.  Pain continues to be severe to the despite better control of his glucose, A1c continues to trend down.  He has seen neurosurgery on multiple occasions, no surgical indications.  He is scheduled for spinal cord stimulator phase 1 trial with Dr. Abraham on 9/11/2023.  He presents today for preop evaluation.    Back Pain  This is a chronic problem. The current episode started more than 1 year ago. The problem occurs daily. The problem has been waxing and waning since onset. The pain is present in the lumbar spine. The quality of the pain is described as burning. The pain radiates to the left thigh, right thigh, right foot and left foot. The pain is at a severity of 6/10. The symptoms are aggravated by position, bending and twisting. Associated symptoms include leg pain, numbness (bilateral legs) and weakness (bilateral legs). Pertinent negatives include no abdominal pain, chest pain, dysuria, fever or headaches. He has tried analgesics, home exercises, muscle relaxant and walking for the symptoms. The treatment provided mild relief.   Leg Pain   The pain is present in the left leg, left foot, right foot and right leg. The quality of the pain is described as burning. The pain is at a severity of 6/10. The pain has been Constant since onset. Associated symptoms include numbness (bilateral legs). The symptoms are aggravated by movement and weight bearing. He has tried acetaminophen, rest and elevation  "for the symptoms. The treatment provided mild relief.      PEG Assessment   What number best describes your pain on average in the past week?7  What number best describes how, during the past week, pain has interfered with your enjoyment of life?8  What number best describes how, during the past week, pain has interfered with your general activity?  8    Review of Pertinent Medical Data ---    The following portions of the patient's history were reviewed and updated as appropriate: allergies, current medications, past family history, past medical history, past social history, past surgical history, and problem list.    Review of Systems   Constitutional:  Negative for fever.   Cardiovascular:  Negative for chest pain.   Gastrointestinal:  Negative for abdominal pain, constipation and diarrhea.   Genitourinary:  Negative for difficulty urinating and dysuria.   Musculoskeletal:  Positive for back pain.   Neurological:  Positive for weakness (bilateral legs) and numbness (bilateral legs). Negative for headaches.   Psychiatric/Behavioral:  Negative for sleep disturbance and suicidal ideas. The patient is not nervous/anxious.      I have reviewed and confirmed the accuracy of the ROS as documented by the MA/ADAM/RN JENNA Haider    Vitals:    09/01/23 1123   BP: 116/58   Pulse: 106   Resp: 18   Temp: 97.5 °F (36.4 °C)   SpO2: 96%   Weight: 104 kg (228 lb 12.8 oz)   Height: 170.2 cm (67\")   PainSc:   6   PainLoc: Back         Objective   Physical Exam  Vitals and nursing note reviewed.   Constitutional:       General: He is not in acute distress.     Appearance: Normal appearance. He is not ill-appearing.   Pulmonary:      Effort: Pulmonary effort is normal. No respiratory distress.   Musculoskeletal:      Lumbar back: Tenderness and bony tenderness present.   Neurological:      Mental Status: He is alert and oriented to person, place, and time.      Motor: Motor function is intact. No weakness.      Gait: Gait " abnormal (slowed).   Psychiatric:         Mood and Affect: Mood normal.         Behavior: Behavior normal.         Assessment & Plan   Diagnoses and all orders for this visit:    1. Chronic pain syndrome (Primary)    2. Diabetic peripheral neuropathy    3. DDD (degenerative disc disease), lumbar    4. Pre-op testing  -     Basic Metabolic Panel; Future  -     CBC & Differential; Future  -     ECG 12 Lead; Future  -     XR Chest 2 View; Future        Spinal Cord Stimulator Phase 1 Trial Postoperative instructions  --- Keep the dressing on the incision. It will be removed at your appointment in the [facility] in 5-7 days  --- Keep the dressing clean and dry. DO NOT shower or submerge yourself in water (I.e. bath tub, pool, etc.) until after you return to the [facility] Clinic and the leads are removed  ---You should have received information from the representative about your stimulator. Please call the representative with any questions about the stimulator.   --- Please call the [facility] for any signs of infection such as new severe back pain or leg numbness/weakness, redness or drainage from the incision sites, or fever greater then 101 F. Call the clinic if the dressing comes off.   --- Your return appointment in the [facility] should be about 5-7 days from the day of surgery.  If you don't have a return appointment call the [facility] Clinic at [phone] to schedule a follow up appointment to remove the dressing and the leads.   --- DO NOT take blood thinners while the stimulator is in place. Confirm with us when you will restart your blood thinners (Based on the instructions given to you by the prescriber of your blood thinners).   --- No heavy lifting >10 pounds. No excessive reaching, bending, or twisting.  Avoid sudden twisting.   --- You can take Tylenol as needed as long as you have no contraindications (liver disease, etc).   --- You may increase the  frequency of your current pain medications for the acute post-operative pain until you are seen in the pain clinic in 5-7 days.  Ask us how much you may increase your pain medications.   --- Take the prescribed antibiotics until you are seen in the [facility] Management Clinic at follow-up.   --- Turn off the stimulator when driving.    [facility] (Monday-Friday, 8:00am-4:30pm)  Ph: [phone]    Post Sedation/Anesthesia Instructions:  Your anesthesia was supervised/directed by a physician anesthesiologist.     The medication you received may make you drowsy.  Your perception, attitude, and reaction time may be affected following sedation or anesthesia.  --- DO NOT drive or operate machinery for 24 hours, or while in severe pain  --- Avoid making critical decisions or signing legal documents for 24 hours  --- DO NOT drink alcoholic beverages for 24 hours and not while taking pain medications.       --- Continue Lyrica   --- sending to lab for pre-op testing     Carlos Reese reports a pain score of 6.  Given his pain assessment as noted, treatment options were discussed and the following options were decided upon as a follow-up plan to address the patient's pain:  see plan above .        --- Follow-up for scs trial and 1 week post for lead pull                  EMMA REPORT    As the clinician, I personally reviewed the EMMA from 9/1/2023 while the patient was in the office today.    History and physical exam exhibit continued safe and appropriate use of controlled substances.       Dictated utilizing Dragon dictation.

## 2023-09-05 ENCOUNTER — HOSPITAL ENCOUNTER (OUTPATIENT)
Dept: GENERAL RADIOLOGY | Facility: HOSPITAL | Age: 53
Discharge: HOME | End: 2023-09-05
Payer: COMMERCIAL

## 2023-09-05 ENCOUNTER — LAB (OUTPATIENT)
Dept: LAB | Facility: HOSPITAL | Age: 53
End: 2023-09-05
Payer: COMMERCIAL

## 2023-09-05 ENCOUNTER — TRANSCRIBE ORDERS (OUTPATIENT)
Dept: ADMINISTRATIVE | Facility: HOSPITAL | Age: 53
End: 2023-09-05
Payer: COMMERCIAL

## 2023-09-05 ENCOUNTER — HOSPITAL ENCOUNTER (OUTPATIENT)
Dept: CARDIOLOGY | Facility: HOSPITAL | Age: 53
Discharge: HOME | End: 2023-09-05
Payer: COMMERCIAL

## 2023-09-05 DIAGNOSIS — Z01.818 PRE-OP TESTING: ICD-10-CM

## 2023-09-05 DIAGNOSIS — Z01.818 PRE-OP TESTING: Primary | ICD-10-CM

## 2023-09-05 LAB
ANION GAP SERPL CALCULATED.3IONS-SCNC: 12.5 MMOL/L (ref 5–15)
BASOPHILS # BLD AUTO: 0.08 10*3/MM3 (ref 0–0.2)
BASOPHILS NFR BLD AUTO: 0.9 % (ref 0–1.5)
BUN SERPL-MCNC: 23 MG/DL (ref 6–20)
BUN/CREAT SERPL: 23 (ref 7–25)
CALCIUM SPEC-SCNC: 9.4 MG/DL (ref 8.6–10.5)
CHLORIDE SERPL-SCNC: 103 MMOL/L (ref 98–107)
CO2 SERPL-SCNC: 22.5 MMOL/L (ref 22–29)
CREAT SERPL-MCNC: 1 MG/DL (ref 0.76–1.27)
DEPRECATED RDW RBC AUTO: 39 FL (ref 37–54)
EGFRCR SERPLBLD CKD-EPI 2021: 89.4 ML/MIN/1.73
EOSINOPHIL # BLD AUTO: 0.44 10*3/MM3 (ref 0–0.4)
EOSINOPHIL NFR BLD AUTO: 4.8 % (ref 0.3–6.2)
ERYTHROCYTE [DISTWIDTH] IN BLOOD BY AUTOMATED COUNT: 12.7 % (ref 12.3–15.4)
GLUCOSE SERPL-MCNC: 313 MG/DL (ref 65–99)
HCT VFR BLD AUTO: 41 % (ref 37.5–51)
HGB BLD-MCNC: 14 G/DL (ref 13–17.7)
IMM GRANULOCYTES # BLD AUTO: 0.13 10*3/MM3 (ref 0–0.05)
IMM GRANULOCYTES NFR BLD AUTO: 1.4 % (ref 0–0.5)
LYMPHOCYTES # BLD AUTO: 2.91 10*3/MM3 (ref 0.7–3.1)
LYMPHOCYTES NFR BLD AUTO: 31.8 % (ref 19.6–45.3)
MCH RBC QN AUTO: 29.4 PG (ref 26.6–33)
MCHC RBC AUTO-ENTMCNC: 34.1 G/DL (ref 31.5–35.7)
MCV RBC AUTO: 86.1 FL (ref 79–97)
MONOCYTES # BLD AUTO: 0.64 10*3/MM3 (ref 0.1–0.9)
MONOCYTES NFR BLD AUTO: 7 % (ref 5–12)
NEUTROPHILS NFR BLD AUTO: 4.96 10*3/MM3 (ref 1.7–7)
NEUTROPHILS NFR BLD AUTO: 54.1 % (ref 42.7–76)
NRBC BLD AUTO-RTO: 0 /100 WBC (ref 0–0.2)
PLATELET # BLD AUTO: 171 10*3/MM3 (ref 140–450)
PMV BLD AUTO: 11.1 FL (ref 6–12)
POTASSIUM SERPL-SCNC: 4.5 MMOL/L (ref 3.5–5.2)
QT INTERVAL: 340 MS
QTC INTERVAL: 412 MS
RBC # BLD AUTO: 4.76 10*6/MM3 (ref 4.14–5.8)
SODIUM SERPL-SCNC: 138 MMOL/L (ref 136–145)
WBC NRBC COR # BLD: 9.16 10*3/MM3 (ref 3.4–10.8)

## 2023-09-05 PROCEDURE — 71046 X-RAY EXAM CHEST 2 VIEWS: CPT

## 2023-09-05 PROCEDURE — 36415 COLL VENOUS BLD VENIPUNCTURE: CPT

## 2023-09-05 PROCEDURE — 80048 BASIC METABOLIC PNL TOTAL CA: CPT

## 2023-09-05 PROCEDURE — 85025 COMPLETE CBC W/AUTO DIFF WBC: CPT

## 2023-09-05 PROCEDURE — 93005 ELECTROCARDIOGRAM TRACING: CPT | Performed by: NURSE PRACTITIONER

## 2023-09-05 NOTE — PROGRESS NOTES
That's not ideal, but it's okay to move forward with his trial as long as he's working on keeping it controlled.

## 2023-09-07 RX ORDER — CEPHALEXIN 500 MG/1
500 CAPSULE ORAL 2 TIMES DAILY
Qty: 10 CAPSULE | Refills: 0 | Status: SHIPPED | OUTPATIENT
Start: 2023-09-07

## 2023-09-08 DIAGNOSIS — E11.42 DIABETIC PERIPHERAL NEUROPATHY: ICD-10-CM

## 2023-09-08 RX ORDER — PREGABALIN 150 MG/1
CAPSULE ORAL
Qty: 90 CAPSULE | Refills: 1 | Status: SHIPPED | OUTPATIENT
Start: 2023-09-08

## 2023-09-11 ENCOUNTER — HOSPITAL ENCOUNTER (OUTPATIENT)
Dept: GENERAL RADIOLOGY | Facility: SURGERY CENTER | Age: 53
Setting detail: HOSPITAL OUTPATIENT SURGERY
End: 2023-09-11
Payer: COMMERCIAL

## 2023-09-11 ENCOUNTER — HOSPITAL ENCOUNTER (OUTPATIENT)
Facility: SURGERY CENTER | Age: 53
Setting detail: HOSPITAL OUTPATIENT SURGERY
Discharge: HOME | End: 2023-09-11
Attending: ANESTHESIOLOGY | Admitting: ANESTHESIOLOGY
Payer: COMMERCIAL

## 2023-09-11 ENCOUNTER — APPOINTMENT (OUTPATIENT)
Dept: GENERAL RADIOLOGY | Facility: SURGERY CENTER | Age: 53
End: 2023-09-11
Payer: COMMERCIAL

## 2023-09-11 VITALS
WEIGHT: 226.4 LBS | TEMPERATURE: 97.8 F | RESPIRATION RATE: 16 BRPM | HEART RATE: 95 BPM | BODY MASS INDEX: 35.46 KG/M2 | OXYGEN SATURATION: 95 % | DIASTOLIC BLOOD PRESSURE: 85 MMHG | SYSTOLIC BLOOD PRESSURE: 132 MMHG

## 2023-09-11 DIAGNOSIS — E11.42 DIABETIC PERIPHERAL NEUROPATHY: ICD-10-CM

## 2023-09-11 DIAGNOSIS — Z41.9 SURGERY, ELECTIVE: ICD-10-CM

## 2023-09-11 PROBLEM — G89.4 CHRONIC PAIN SYNDROME: Status: ACTIVE | Noted: 2023-09-11

## 2023-09-11 LAB — GLUCOSE BLDC GLUCOMTR-MCNC: 202 MG/DL (ref 70–130)

## 2023-09-11 PROCEDURE — 25010000002 MIDAZOLAM PER 1MG: Performed by: ANESTHESIOLOGY

## 2023-09-11 PROCEDURE — 63650 IMPLANT NEUROELECTRODES: CPT | Performed by: ANESTHESIOLOGY

## 2023-09-11 PROCEDURE — C1897 LEAD, NEUROSTIM TEST KIT: HCPCS | Performed by: ANESTHESIOLOGY

## 2023-09-11 PROCEDURE — 63685 INS/RPLC SPI NPG/RCVR POCKET: CPT | Performed by: ANESTHESIOLOGY

## 2023-09-11 PROCEDURE — 76000 FLUOROSCOPY <1 HR PHYS/QHP: CPT

## 2023-09-11 PROCEDURE — 25010000002 FENTANYL CITRATE (PF) 50 MCG/ML SOLUTION: Performed by: ANESTHESIOLOGY

## 2023-09-11 DEVICE — TRIAL LEAD KIT, 50CM WITH 5MM SPACING, CURVED STYLET .016"
Type: IMPLANTABLE DEVICE | Site: SPINE LUMBAR | Status: FUNCTIONAL
Brand: SENZA

## 2023-09-11 RX ORDER — MIDAZOLAM HYDROCHLORIDE 1 MG/ML
2 INJECTION INTRAMUSCULAR; INTRAVENOUS ONCE
Status: COMPLETED | OUTPATIENT
Start: 2023-09-11 | End: 2023-09-11

## 2023-09-11 RX ORDER — SODIUM CHLORIDE 0.9 % (FLUSH) 0.9 %
10 SYRINGE (ML) INJECTION EVERY 12 HOURS SCHEDULED
Status: DISCONTINUED | OUTPATIENT
Start: 2023-09-11 | End: 2023-09-11 | Stop reason: HOSPADM

## 2023-09-11 RX ORDER — SODIUM CHLORIDE 0.9 % (FLUSH) 0.9 %
10 SYRINGE (ML) INJECTION AS NEEDED
Status: DISCONTINUED | OUTPATIENT
Start: 2023-09-11 | End: 2023-09-11 | Stop reason: HOSPADM

## 2023-09-11 RX ORDER — FENTANYL CITRATE 50 UG/ML
50 INJECTION, SOLUTION INTRAMUSCULAR; INTRAVENOUS
Status: DISCONTINUED | OUTPATIENT
Start: 2023-09-11 | End: 2023-09-11 | Stop reason: HOSPADM

## 2023-09-11 RX ADMIN — MIDAZOLAM HYDROCHLORIDE 2 MG: 2 INJECTION, SOLUTION INTRAMUSCULAR; INTRAVENOUS at 08:22

## 2023-09-11 RX ADMIN — FENTANYL CITRATE 50 MCG: 0.05 INJECTION, SOLUTION INTRAMUSCULAR; INTRAVENOUS at 08:22

## 2023-09-11 NOTE — OP NOTE
Spinal Cord Stimulation - Trial of Dorsal Column Stimulator  [facility]    SURGEON:   Katerina Abraham MD    Preop Dx: Chronic Pain Syndrome and Painful diabetic neuropathy  Postop Dx: Same as preop dx    SCS System: Nevro    Operative Findings:  1. Successful Placement of a Trial Dorsal Column Stimulator  2.  8 contact leads placed at the Top and Bottom of the 8th thoracic vertebral body  3. Lead serial numbers: [id number], [id number]      Preprocedure Discussion with Patient:  Risks and complications were discussed with the patient prior to starting the procedure and informed consent was obtained.  We discussed various topics including but not limited to bleeding, infection, injury, allergic reactions, spinal cord and/or neural injury, implant site pain, post procedural site soreness, equipment malfunction including but not limited to lead fracture or lead migration or risk of electrical shock or risk of loss stimulation, risk of the lack of pain relief, and risk of worsening clinical picture.    Reason for procedure:  Painful diabetic neuropathy.  This patient had a favorable psychological profile noted prior to trialing.      Sedation:  Local Anesthetics & IV sedation administered by RN  Antibiotics:   Keflex 500mg PO 1 hour prior to start    Description of Procedure:    After obtaining informed consent, IV access had been obtained by nursing staff in the preoperative area.  The patient was transported to the procedure suite and was placed in a prone position.  Appropriate padding was placed under the patient's abdomen.   Anesthesia monitors were applied to the patient and vitals were noted to be stable throughout the procedure. The skin overlying the back was prepped with 70% isopropanol x 3 and with chloraprep x 2.  Sterile drapes were then applied in usual sterile fashion.  A pre-procedure time-out was performed to ensure that the correct procedure was being performed on the correct patient.   Antibiotics had been started at an appropriate interval prior to initiation of the procedure.    The T12-L1 interspace was identified on  fluoroscopy and a tract of anesthesia was created with 5 mLs of a 1:1 solution of 2% lidocaine and 0.25% bupivacaine with epinephrine 1:100,000 for each of the 2 entry sites.  Next, 14 ga coude needles were inserted through the anesthetized skin tract and were advanced towards the desired interspace.  Using a loss of resistance technique and intermittent projection of fluoroscopy, the epidural space was identified on the first pass.  The leads were traveling anterior with this access, so access was then obtained at T11-T12 bilaterally.     Next, a 8 contact lead was threaded through each of the needles.  They were advanced under live fluoroscopic guidance to the top and bottom of the 7th thoracic vertebral body.  The device representative was present to confirm proper positioning with fluoroscopic guidance.  After appropriate findings were confirmed, the needles and stylets were removed under live fluoroscopy to ensure that the lead tips did not migrate.    Next, the leads were secured to the skin using mastisol, steri-strips, and sterile dressings to cover the entry sites.  The patient was then placed back in the supine position on the transportation bed and was transported back to the post-operative anesthesia care unit in stable condition with no apparent complications.    The staff pain provider was present throughout the entirety of the procedure.      Estimated Blood Loss: None  Specimens:   None  Complications:  No complications were noted.  Fluids:    0mL  Drains/Packs:   None    Tolerance and discharge condition:    The patient tolerated the procedure well and was awakened from anesthesia without incident.  The patient was transported to the recovery area without difficulties.  Further device programming was performed by the spinal cord stimulation device  representative in the recovery area.  The patient was again cautioned not to drive at this time and avoid strenuous activities and to avoid reaching overhead and to avoid bending and avoid lifting objects over 5 pounds.  The patient was discharged home under the care of family members in stable and satisfactory condition.      Plan of care:    The patient was given our standard instruction sheet and will resume all medications as per the medication reconciliation sheet.  The patient will return to my office at the appropriate time scheduled in about one business day with the stimulation device representative for further device programming and education about device function if necessary.  Patient will be discharged home and will return to the pain clinic in 5-7 days for lead removal and evaluation of trial efficacy.    The patient understands that there is any signs of complication, bleeding, infection, fever, chills, increasing back pain or spine pain, any neurologic deficit, or any other concerning finding, that the patient of a family member should call my office at [phone] at any time to access the answering service.    INITIAL STIMULATION SETTINGS:  Per device representative     Prescriptions Provided: Keflex 500mg BID #10

## 2023-09-11 NOTE — DISCHARGE INSTRUCTIONS
Spinal Cord Stimulator Trial Postoperative instructions:  Keep the dressing on the incision. It will be removed at your appointment in Pain Medicine Clinic in 5-7 days.  Keep the dressing clean and dry. Do NOT shower or submerge yourself in water (i.e. bath tub, pool, etc.) until after you return to Pain Medicine Clinic and the leads are removed.  You should have received information from the representative about your stimulator. Please call the representative with any questions about the stimulator.   Please call the [facility] Clinic number below for any signs of infection such as new severe back pain or leg numbness/weakness, redness or discharge from the incision sites, or fever greater than 101 F.  Call us if the dressing comes off.  Your return appointment in the Pain Medicine Clinic should be about 5-7 days from today. If you don't have a return appointment, call the Pain Medicine Clinic at [phone] to schedule a follow up appointment to remove the dressing and leads.  Do NOT take blood thinners while the stimulator is in place. Confirm with us when you will restart you blood thinners (based on the instructions given to you by the prescriber of your blood thinners.)  No heavy lifting >10 pounds. No excessive reaching, bending, or twisting.  Avoid sudden twisting.  You can take Tylenol as needed as long as you have no contraindications (liver disease, etc.).  You my increase the frequency of your current pain medications for the acute post-operative pain until you are seen in Pain Clinic in 5-7 days. Ask us how much you may increase your pain medications.  Take the prescribed antibiotics until you are seen in the Pain Medicine Clinic at follow up.  Turn off the stimulator when driving.    [facility] (Monday--Friday, 8:00AM - 4:30 PM):  [place] [phone]    Post Sedation/Anesthesia Instructions:  Your anesthesia was supervised/directed by a Physician Anesthesiologist.  The medication you  received may make you drowsy. Your perception, attitude, and reaction time may be affected following sedation or anesthesia.  Do NOT drive or operate machinery for 24 hours, or while in severe pain.  Avoid making critical decisions or signing legal documents for 24 hours.  Do NOT drink alcoholic beverages for 24 hours and not while taking pain medications.

## 2023-09-15 ENCOUNTER — OFFICE VISIT (OUTPATIENT)
Dept: PAIN MEDICINE | Facility: CLINIC | Age: 53
End: 2023-09-15
Payer: COMMERCIAL

## 2023-09-15 ENCOUNTER — PREP FOR SURGERY (OUTPATIENT)
Dept: SURGERY | Facility: SURGERY CENTER | Age: 53
End: 2023-09-15
Payer: COMMERCIAL

## 2023-09-15 VITALS
DIASTOLIC BLOOD PRESSURE: 90 MMHG | HEIGHT: 67 IN | OXYGEN SATURATION: 96 % | RESPIRATION RATE: 18 BRPM | BODY MASS INDEX: 35.91 KG/M2 | HEART RATE: 102 BPM | TEMPERATURE: 96.9 F | SYSTOLIC BLOOD PRESSURE: 130 MMHG | WEIGHT: 228.8 LBS

## 2023-09-15 DIAGNOSIS — Z01.818 PRE-OP TESTING: ICD-10-CM

## 2023-09-15 DIAGNOSIS — G89.4 CHRONIC PAIN SYNDROME: ICD-10-CM

## 2023-09-15 DIAGNOSIS — E11.42 DIABETIC PERIPHERAL NEUROPATHY: Primary | ICD-10-CM

## 2023-09-15 RX ORDER — SODIUM CHLORIDE 0.9 % (FLUSH) 0.9 %
10 SYRINGE (ML) INJECTION EVERY 12 HOURS SCHEDULED
OUTPATIENT
Start: 2023-09-15

## 2023-09-15 RX ORDER — SODIUM CHLORIDE 0.9 % (FLUSH) 0.9 %
10 SYRINGE (ML) INJECTION AS NEEDED
OUTPATIENT
Start: 2023-09-15

## 2023-09-15 NOTE — PROGRESS NOTES
CHIEF COMPLAINT  Diabetic peripheral neuropathy   Back pain    Subjective   Carlos Reese is a 52 y.o. male  who presents to the office for follow-up of procedure.  He completed a SPINAL CORD STIMULATOR INSERTION PHASE 1 NEVRO    on  9/11/23 performed by Dr. Abraham for management of Diabetic peripheral neuropathy . Patient reports 60% pain relief during the trial. He also reports significant improvement in his mood as well as his ability to walk. He presents today for trial end and led pull.  He is quite happy with results of the trial and looks forward to moving on to permanent implantation of the system.      History of Present Illness     PEG Assessment   What number best describes your pain on average in the past week?4  What number best describes how, during the past week, pain has interfered with your enjoyment of life?5  What number best describes how, during the past week, pain has interfered with your general activity?  5    Review of Pertinent Medical Data ---    The following portions of the patient's history were reviewed and updated as appropriate: allergies, current medications, past family history, past medical history, past social history, past surgical history, and problem list.    Review of Systems   Constitutional:  Negative for activity change and fatigue.   Gastrointestinal:  Negative for abdominal pain, constipation and diarrhea.   Genitourinary:  Negative for difficulty urinating and dysuria.   Musculoskeletal:  Positive for back pain.   Neurological:  Negative for dizziness, weakness, light-headedness, numbness and headaches.   Psychiatric/Behavioral:  Negative for agitation, sleep disturbance and suicidal ideas. The patient is not nervous/anxious.      I have reviewed and confirmed the accuracy of the ROS as documented by the MA/ADAM/RN JENNA Haider     Vitals:    09/15/23 1131   BP: 130/90   BP Location: Right arm   Patient Position: Sitting   Cuff Size: Adult   Pulse: 102  "  Resp: 18   Temp: 96.9 °F (36.1 °C)   SpO2: 96%   Weight: 104 kg (228 lb 12.8 oz)   Height: 170.2 cm (67\")   PainSc:   5   PainLoc: Back         Objective   Physical Exam  Vitals and nursing note reviewed.   Constitutional:       General: He is not in acute distress.     Appearance: Normal appearance.   Skin:         Neurological:      Mental Status: He is alert and oriented to person, place, and time.      Gait: Gait abnormal (slowed).         Assessment & Plan   Diagnoses and all orders for this visit:    1. Diabetic peripheral neuropathy (Primary)    2. Chronic pain syndrome    3. Pre-op testing  -     CBC & Differential; Future  -     Comprehensive Metabolic Panel; Future  -     MRSA Screen Culture (Outpatient) - Swab, Nares; Future        This patient presents today for in-office removal of spinal cord stimulation trialing leads.  The dressings were removed and the leads were exposed.  There was no indication of infection or irritation.  The leads were easily pulled from the epidural space and removed.  Lead tips were intact.     The patient had a very successful trial with consistent pain relief of at least 60%.  The patient is pleased with the results and is requesting that we proceed to the permanent implantation of the leads along with the implantable pulse generator.      We discussed reasonable expectations about the permanent device performance, and I feel that the patient expresses reasonable expectations.  The patient states that if the relief provided from the permanent device is the same as the trialing system then this would be regarded as a success.  We discussed risks, including but not limited to bleeding, infection, nerve or spinal cord injury, postprocedural pain, device malfunction, lead migration, risk of shock, and risk of loss of long term pain relief.  The patient verbalized informed consent to proceed.  We will seek authorization for SCS Phase 2, with the NEVRO system.      -- Follow-up " for implant... Seeking auth for SCS Phase 2   -- I feel that this patient is an appropriate candidate for SCS Phase Two implantation.  We plan to use the Nevro system.    -- This patient does not have any evidence of sepsis nor coagulopathy.  -- Patient is not a clear candidate for neurosurgical intervention, and has no other reasonable options for basic interventions, injection therapy, and physical therapy.  -- We plan to use the SCS trial findings to reproduce that same placement location as we proceed with implantation SCS Phase 2.    -- Goals are to improve functionality and activity as well as decrease and/or eliminate the need for oral medication management.      --- He understands that he will need to hold his long acting anti-diabetic medication (Ozempic) for 7 days prior to surgery       Carlos Reese reports a pain score of 5.  Given his pain assessment as noted, treatment options were discussed and the following options were decided upon as a follow-up plan to address the patient's pain:  see plan above .      --- Follow-up for permanent implant                 Dictated utilizing Dragon dictation.

## 2023-09-28 ENCOUNTER — TRANSCRIBE ORDERS (OUTPATIENT)
Dept: SURGERY | Facility: SURGERY CENTER | Age: 53
End: 2023-09-28
Payer: COMMERCIAL

## 2023-09-28 DIAGNOSIS — Z41.9 SURGERY, ELECTIVE: Primary | ICD-10-CM

## 2023-10-05 ENCOUNTER — TELEPHONE (OUTPATIENT)
Dept: PAIN MEDICINE | Facility: CLINIC | Age: 53
End: 2023-10-05

## 2023-10-05 NOTE — TELEPHONE ENCOUNTER
"Caller: Carlos Reese \"Hansel\"    Relationship to patient: Self    Best call back number: [phone]    Patient is needing: PATIENT NOTICED ON MYCHART THAT HE HAD A PROCEDURE SCHEDULED ON 10/23/23, BUT HE WAS NOT AWARE OF THIS APPT.  HE IS ASKING FOR NOTIFICATION ON IF THIS APPT IS SET IN STONE OR IF IT NOT FULLY APPROVED BY INSURANCE AD OF YET.   PLEASE ADVISE.       "

## 2023-10-16 ENCOUNTER — LAB (OUTPATIENT)
Dept: LAB | Facility: HOSPITAL | Age: 53
End: 2023-10-16
Payer: COMMERCIAL

## 2023-10-16 DIAGNOSIS — Z01.818 PRE-OP TESTING: ICD-10-CM

## 2023-10-16 LAB
ALBUMIN SERPL-MCNC: 4.4 G/DL (ref 3.5–5.2)
ALBUMIN/GLOB SERPL: 1.8 G/DL
ALP SERPL-CCNC: 52 U/L (ref 39–117)
ALT SERPL W P-5'-P-CCNC: 39 U/L (ref 1–41)
ANION GAP SERPL CALCULATED.3IONS-SCNC: 10.9 MMOL/L (ref 5–15)
AST SERPL-CCNC: 22 U/L (ref 1–40)
BASOPHILS # BLD AUTO: 0.07 10*3/MM3 (ref 0–0.2)
BASOPHILS NFR BLD AUTO: 0.9 % (ref 0–1.5)
BILIRUB SERPL-MCNC: 0.6 MG/DL (ref 0–1.2)
BUN SERPL-MCNC: 17 MG/DL (ref 6–20)
BUN/CREAT SERPL: 20 (ref 7–25)
CALCIUM SPEC-SCNC: 9.9 MG/DL (ref 8.6–10.5)
CHLORIDE SERPL-SCNC: 108 MMOL/L (ref 98–107)
CO2 SERPL-SCNC: 21.1 MMOL/L (ref 22–29)
CREAT SERPL-MCNC: 0.85 MG/DL (ref 0.76–1.27)
DEPRECATED RDW RBC AUTO: 39.1 FL (ref 37–54)
EGFRCR SERPLBLD CKD-EPI 2021: 103.3 ML/MIN/1.73
EOSINOPHIL # BLD AUTO: 0.39 10*3/MM3 (ref 0–0.4)
EOSINOPHIL NFR BLD AUTO: 5 % (ref 0.3–6.2)
ERYTHROCYTE [DISTWIDTH] IN BLOOD BY AUTOMATED COUNT: 12.7 % (ref 12.3–15.4)
GLOBULIN UR ELPH-MCNC: 2.4 GM/DL
GLUCOSE SERPL-MCNC: 226 MG/DL (ref 65–99)
HCT VFR BLD AUTO: 42.1 % (ref 37.5–51)
HGB BLD-MCNC: 14.3 G/DL (ref 13–17.7)
IMM GRANULOCYTES # BLD AUTO: 0.06 10*3/MM3 (ref 0–0.05)
IMM GRANULOCYTES NFR BLD AUTO: 0.8 % (ref 0–0.5)
LYMPHOCYTES # BLD AUTO: 2.14 10*3/MM3 (ref 0.7–3.1)
LYMPHOCYTES NFR BLD AUTO: 27.5 % (ref 19.6–45.3)
MCH RBC QN AUTO: 29.1 PG (ref 26.6–33)
MCHC RBC AUTO-ENTMCNC: 34 G/DL (ref 31.5–35.7)
MCV RBC AUTO: 85.6 FL (ref 79–97)
MONOCYTES # BLD AUTO: 0.91 10*3/MM3 (ref 0.1–0.9)
MONOCYTES NFR BLD AUTO: 11.7 % (ref 5–12)
NEUTROPHILS NFR BLD AUTO: 4.2 10*3/MM3 (ref 1.7–7)
NEUTROPHILS NFR BLD AUTO: 54.1 % (ref 42.7–76)
NRBC BLD AUTO-RTO: 0 /100 WBC (ref 0–0.2)
PLATELET # BLD AUTO: 180 10*3/MM3 (ref 140–450)
PMV BLD AUTO: 11.6 FL (ref 6–12)
POTASSIUM SERPL-SCNC: 4.1 MMOL/L (ref 3.5–5.2)
PROT SERPL-MCNC: 6.8 G/DL (ref 6–8.5)
RBC # BLD AUTO: 4.92 10*6/MM3 (ref 4.14–5.8)
SODIUM SERPL-SCNC: 140 MMOL/L (ref 136–145)
WBC NRBC COR # BLD: 7.77 10*3/MM3 (ref 3.4–10.8)

## 2023-10-16 PROCEDURE — 80053 COMPREHEN METABOLIC PANEL: CPT

## 2023-10-16 PROCEDURE — 36415 COLL VENOUS BLD VENIPUNCTURE: CPT

## 2023-10-16 PROCEDURE — 87081 CULTURE SCREEN ONLY: CPT

## 2023-10-16 PROCEDURE — 85025 COMPLETE CBC W/AUTO DIFF WBC: CPT

## 2023-10-17 LAB — MRSA SPEC QL CULT: NORMAL

## 2023-10-19 RX ORDER — INSULIN GLARGINE 100 [IU]/ML
20 INJECTION, SOLUTION SUBCUTANEOUS
COMMUNITY
Start: 2023-09-06

## 2023-10-19 NOTE — SIGNIFICANT NOTE
Education provided the Patient on the following:    - Nothing to Eat or Drink after MN the night before the procedure  -You will need to have someone drive you home after your Surgery and remain with you for 24 hours after the Procedure  - The date of your procedure, your are welcome to have one visitor at bedside or remain within 10-15 minutes of [place]  -Please wear warm socks when you arrive for your Surgery  -Remove all jewelry and leave any valuables before arriving on the date of your procedure (all will have to be removed before leaving preop)  -You will need to arrive at 0630 on 10/23 Surgery  -Feel free to contact us at: [phone] with any additional questions/concerns

## 2023-10-19 NOTE — DISCHARGE INSTRUCTIONS
Spinal Cord Stimulator Permanent Implant Postoperative instructions:  Keep the dressing on the incision for 3 days, then remove it and leave the incision open to air. If you were given an abdominal binder, wear that as much as possible until your follow-up.  Keep the dressing clean and dry. Do NOT shower or submerge yourself in water (i.e. bath tub, pool, etc.) until the staples are removed in Pain Medicine Clinic in 10-14 days.  You should have received information from the representative about your stimulator. Please call the representative with any questions about the stimulator.   Please call the Pain Medicine Clinic number below for any signs of infection such as new severe back pain or leg numbness/weakness, redness or discharge from the incision sites, or fever greater than 101 F.  Your return appointment in the Pain Medicine Clinic should be 10-14 days from today. If you don't have a return appointment, call the Pain Medicine Clinic at [phone] to schedule a follow up appointment to remove the staples.  If you take blood thinners, confirm with us when you will restart you blood thinners (based on the instructions given to you by the prescriber of your blood thinners.)  No heavy lifting >10 pounds for 6 weeks. No excessive reaching, bending, or twisting for at least 6 weeks. Avoid sudden twisting.  You can take Tylenol as needed as long as you have no contraindications (liver disease, etc.).  You may be instructed to increase the frequency of your current pain medications for the acute post-operative pain until you are seen in Pain Clinic in 10-14 days. Ask us how much you may increase your pain medications.  Turn off the stimulator when driving.  Pain Medicine Clinic (Monday--Friday, 8:00AM - 4:30 PM):  Ph. [phone]  Post Sedation/Anesthesia Instructions:  Your anesthesia was supervised/directed by a Physician Anesthesiologist.  The medication you received may make you drowsy. Your perception,  attitude, and reaction time may be affected following sedation or anesthesia.  Do NOT drive or operate machinery for 24 hours, or while in severe pain.  Avoid making critical decisions or signing legal documents for 24 hours.  Do NOT drink alcoholic beverages for 24 hours and not while taking pain medications.        [facility] - Post-op Instructions after Spinal Cord Stimulation Trials / Implants      - A responsible adult should accompany you on discharge and for 24 hours following your surgery.    - You should rest for the remainder of the day. You may have occasional dizziness. Have assistance available.    - Due to the potential effects of the intravenous sedation or anesthetics, DO NOT drive or operate hazardous machinery today.  Do not make important decisions today.  Do not drink alcohol for 12 hours.    - Resume intake slowly. Start with liquids, progress to soft foods then advance to your regular diet.    - For 24 hours, or while taking pain or nausea medicines, do not drive, operate machinery, drink alcohol or make any important decisions.    - If you should have any of the following symptoms, call your doctor:  Increase in swelling, redness or discharge @ incision; Severe leg weakness/numbness; Severe Pain; Persistent bleeding; Persistent nausea and vomiting; Fever greater than 101 degrees F    - Keep dressing clean, dry and intact. For Permanent Implant, remove dressing on day 3. If given abdominal binder or if instructed to obtain one, wear as much as possible until office followup.    - Dr. Abraham or Dr. Mccollum or one of our APC clinicians will remove outer dressing in the office after SCS trial.    - Steri-strips should remain in place for up to 2 weeks. They will gradually come off on their own or your doctor will remove them at your follow-up visit.    - You MAY NOT bathe or shower during the SCS trial.    - Keep any staples clean & dry until removal. DO NOT bathe, shower or go into a  pool until staples are removed.    - Apply ice pack for 2-3 days as tolerated.    - Information given on new medications.    - You may resume normal activity slowly as tolerated    - No strenuous activity! - no lifting, no bending, no twisting during an SCS trial and for at least 8 weeks after an implant.  NO LIFTING more than 10 lbs.    - No driving automobile for today. Turn off stimulator when driving.    - You may return to work/school 24 hours after SCS trial and 72 hrs after SCS Implant.  Again, follow the above instructions to avoid strenuous activity and/or lifting.    - IF YOU TAKE BLOOD THINNERS, CONFIRM WITH US WHEN TO RESTART BLOOD THINNERS!    - TAKE TYLENOL AS NEEDED AS LONG AS YOU HAVE NO CONTRAINDICATIONS.  You may continue to take your prescribed pain medication as needed unless instructed otherwise.    - Call [facility] at [phone] if you experience persistent headache, persistent bleeding for drainage from the surgical site, temperature over 101°, or severe pain not relieved by basic treatment or prescribed oral medication.    - Refer to Doctor’s instruction sheet.    - Call our office at phone number [phone] for an appointment if not scheduled.    - IN THE EVENT OF A LIFE THREATENING EMERGENCY, (CHEST PAIN, BREATHING DIFFICULTIES, PARALYSIS…) YOU SHOULD GO TO YOUR NEAREST EMERGENCY ROOM.

## 2023-10-23 ENCOUNTER — HOSPITAL ENCOUNTER (OUTPATIENT)
Dept: GENERAL RADIOLOGY | Facility: SURGERY CENTER | Age: 53
Setting detail: HOSPITAL OUTPATIENT SURGERY
End: 2023-10-23
Payer: COMMERCIAL

## 2023-10-23 ENCOUNTER — ANESTHESIA EVENT (OUTPATIENT)
Dept: SURGERY | Facility: SURGERY CENTER | Age: 53
End: 2023-10-23
Payer: COMMERCIAL

## 2023-10-23 ENCOUNTER — HOSPITAL ENCOUNTER (OUTPATIENT)
Facility: SURGERY CENTER | Age: 53
Setting detail: HOSPITAL OUTPATIENT SURGERY
Discharge: HOME | End: 2023-10-23
Attending: ANESTHESIOLOGY | Admitting: ANESTHESIOLOGY
Payer: COMMERCIAL

## 2023-10-23 ENCOUNTER — ANESTHESIA (OUTPATIENT)
Dept: SURGERY | Facility: SURGERY CENTER | Age: 53
End: 2023-10-23
Payer: COMMERCIAL

## 2023-10-23 VITALS
OXYGEN SATURATION: 97 % | TEMPERATURE: 98.2 F | DIASTOLIC BLOOD PRESSURE: 75 MMHG | HEART RATE: 80 BPM | SYSTOLIC BLOOD PRESSURE: 110 MMHG | WEIGHT: 233.4 LBS | HEIGHT: 67 IN | BODY MASS INDEX: 36.63 KG/M2 | RESPIRATION RATE: 18 BRPM

## 2023-10-23 DIAGNOSIS — E11.42 DIABETIC PERIPHERAL NEUROPATHY: ICD-10-CM

## 2023-10-23 DIAGNOSIS — Z41.9 SURGERY, ELECTIVE: ICD-10-CM

## 2023-10-23 DIAGNOSIS — G89.18 PAIN ASSOCIATED WITH SURGICAL PROCEDURE: Primary | ICD-10-CM

## 2023-10-23 LAB — GLUCOSE BLDC GLUCOMTR-MCNC: 152 MG/DL (ref 70–130)

## 2023-10-23 PROCEDURE — C1897 LEAD, NEUROSTIM TEST KIT: HCPCS | Performed by: ANESTHESIOLOGY

## 2023-10-23 PROCEDURE — 25010000002 FENTANYL CITRATE (PF) 50 MCG/ML SOLUTION: Performed by: STUDENT IN AN ORGANIZED HEALTH CARE EDUCATION/TRAINING PROGRAM

## 2023-10-23 PROCEDURE — 25810000003 LACTATED RINGERS PER 1000 ML: Performed by: ANESTHESIOLOGY

## 2023-10-23 PROCEDURE — 25010000002 VANCOMYCIN 1 G RECONSTITUTED SOLUTION 1 EACH VIAL: Performed by: ANESTHESIOLOGY

## 2023-10-23 PROCEDURE — 63650 IMPLANT NEUROELECTRODES: CPT | Performed by: ANESTHESIOLOGY

## 2023-10-23 PROCEDURE — 25810000003 SODIUM CHLORIDE 0.9 % SOLUTION 500 ML FLEX CONT: Performed by: ANESTHESIOLOGY

## 2023-10-23 PROCEDURE — 63685 INS/RPLC SPI NPG/RCVR POCKET: CPT | Performed by: ANESTHESIOLOGY

## 2023-10-23 PROCEDURE — 25010000002 PROPOFOL 500 MG/50ML EMULSION: Performed by: STUDENT IN AN ORGANIZED HEALTH CARE EDUCATION/TRAINING PROGRAM

## 2023-10-23 PROCEDURE — C1822 GEN, NEURO, HF, RECHG BAT: HCPCS | Performed by: ANESTHESIOLOGY

## 2023-10-23 PROCEDURE — C1889 IMPLANT/INSERT DEVICE, NOC: HCPCS | Performed by: ANESTHESIOLOGY

## 2023-10-23 PROCEDURE — 25010000002 ONDANSETRON PER 1 MG: Performed by: STUDENT IN AN ORGANIZED HEALTH CARE EDUCATION/TRAINING PROGRAM

## 2023-10-23 PROCEDURE — 25010000002 PROPOFOL 10 MG/ML EMULSION: Performed by: STUDENT IN AN ORGANIZED HEALTH CARE EDUCATION/TRAINING PROGRAM

## 2023-10-23 PROCEDURE — 76000 FLUOROSCOPY <1 HR PHYS/QHP: CPT

## 2023-10-23 DEVICE — KT IPG HFXIQ/SENZA NIPG3000 W/CHRGR/RMT: Type: IMPLANTABLE DEVICE | Site: SPINE LUMBAR | Status: FUNCTIONAL

## 2023-10-23 DEVICE — N300 LEAD ANCHOR KIT
Type: IMPLANTABLE DEVICE | Site: SPINE LUMBAR | Status: FUNCTIONAL
Brand: NEVRO®

## 2023-10-23 DEVICE — BLUE LEAD KIT, 50CM WITH 5MM SPACING
Type: IMPLANTABLE DEVICE | Site: SPINE LUMBAR | Status: FUNCTIONAL
Brand: NEVRO®

## 2023-10-23 RX ORDER — PROMETHAZINE HYDROCHLORIDE 12.5 MG/1
25 TABLET ORAL ONCE AS NEEDED
Status: DISCONTINUED | OUTPATIENT
Start: 2023-10-23 | End: 2023-10-23 | Stop reason: HOSPADM

## 2023-10-23 RX ORDER — ACETAMINOPHEN 500 MG
1000 TABLET ORAL ONCE
Status: COMPLETED | OUTPATIENT
Start: 2023-10-23 | End: 2023-10-23

## 2023-10-23 RX ORDER — PROMETHAZINE HYDROCHLORIDE 25 MG/1
25 SUPPOSITORY RECTAL ONCE AS NEEDED
Status: DISCONTINUED | OUTPATIENT
Start: 2023-10-23 | End: 2023-10-23 | Stop reason: HOSPADM

## 2023-10-23 RX ORDER — HYDROCODONE BITARTRATE AND ACETAMINOPHEN 7.5; 325 MG/1; MG/1
1 TABLET ORAL EVERY 4 HOURS PRN
Qty: 30 TABLET | Refills: 0 | Status: SHIPPED | OUTPATIENT
Start: 2023-10-23

## 2023-10-23 RX ORDER — DIPHENHYDRAMINE HYDROCHLORIDE 50 MG/ML
12.5 INJECTION INTRAMUSCULAR; INTRAVENOUS
Status: DISCONTINUED | OUTPATIENT
Start: 2023-10-23 | End: 2023-10-23 | Stop reason: HOSPADM

## 2023-10-23 RX ORDER — DROPERIDOL 2.5 MG/ML
0.62 INJECTION, SOLUTION INTRAMUSCULAR; INTRAVENOUS
Status: DISCONTINUED | OUTPATIENT
Start: 2023-10-23 | End: 2023-10-23 | Stop reason: HOSPADM

## 2023-10-23 RX ORDER — LIDOCAINE HYDROCHLORIDE 20 MG/ML
INJECTION, SOLUTION INFILTRATION; PERINEURAL AS NEEDED
Status: DISCONTINUED | OUTPATIENT
Start: 2023-10-23 | End: 2023-10-23 | Stop reason: SURG

## 2023-10-23 RX ORDER — SODIUM CHLORIDE, SODIUM LACTATE, POTASSIUM CHLORIDE, CALCIUM CHLORIDE 600; 310; 30; 20 MG/100ML; MG/100ML; MG/100ML; MG/100ML
1000 INJECTION, SOLUTION INTRAVENOUS CONTINUOUS
Status: DISCONTINUED | OUTPATIENT
Start: 2023-10-23 | End: 2023-10-23 | Stop reason: HOSPADM

## 2023-10-23 RX ORDER — CEPHALEXIN 500 MG/1
500 CAPSULE ORAL 2 TIMES DAILY
Qty: 14 CAPSULE | Refills: 0 | Status: SHIPPED | OUTPATIENT
Start: 2023-10-23

## 2023-10-23 RX ORDER — SODIUM CHLORIDE, SODIUM LACTATE, POTASSIUM CHLORIDE, CALCIUM CHLORIDE 600; 310; 30; 20 MG/100ML; MG/100ML; MG/100ML; MG/100ML
9 INJECTION, SOLUTION INTRAVENOUS CONTINUOUS
Status: DISCONTINUED | OUTPATIENT
Start: 2023-10-23 | End: 2023-10-23 | Stop reason: HOSPADM

## 2023-10-23 RX ORDER — ONDANSETRON 2 MG/ML
4 INJECTION INTRAMUSCULAR; INTRAVENOUS ONCE AS NEEDED
Status: DISCONTINUED | OUTPATIENT
Start: 2023-10-23 | End: 2023-10-23 | Stop reason: HOSPADM

## 2023-10-23 RX ORDER — FLUMAZENIL 0.1 MG/ML
0.2 INJECTION INTRAVENOUS AS NEEDED
Status: DISCONTINUED | OUTPATIENT
Start: 2023-10-23 | End: 2023-10-23 | Stop reason: HOSPADM

## 2023-10-23 RX ORDER — MAGNESIUM HYDROXIDE 1200 MG/15ML
LIQUID ORAL AS NEEDED
Status: DISCONTINUED | OUTPATIENT
Start: 2023-10-23 | End: 2023-10-23 | Stop reason: HOSPADM

## 2023-10-23 RX ORDER — FENTANYL CITRATE 0.05 MG/ML
INJECTION, SOLUTION INTRAMUSCULAR; INTRAVENOUS AS NEEDED
Status: DISCONTINUED | OUTPATIENT
Start: 2023-10-23 | End: 2023-10-23 | Stop reason: SURG

## 2023-10-23 RX ORDER — PROPOFOL 10 MG/ML
INJECTION, EMULSION INTRAVENOUS AS NEEDED
Status: DISCONTINUED | OUTPATIENT
Start: 2023-10-23 | End: 2023-10-23 | Stop reason: SURG

## 2023-10-23 RX ORDER — SODIUM CHLORIDE 0.9 % (FLUSH) 0.9 %
3-10 SYRINGE (ML) INJECTION AS NEEDED
Status: DISCONTINUED | OUTPATIENT
Start: 2023-10-23 | End: 2023-10-23 | Stop reason: HOSPADM

## 2023-10-23 RX ORDER — SODIUM CHLORIDE 0.9 % (FLUSH) 0.9 %
10 SYRINGE (ML) INJECTION AS NEEDED
Status: DISCONTINUED | OUTPATIENT
Start: 2023-10-23 | End: 2023-10-23 | Stop reason: HOSPADM

## 2023-10-23 RX ORDER — BUPIVACAINE HYDROCHLORIDE AND EPINEPHRINE 2.5; 5 MG/ML; UG/ML
INJECTION, SOLUTION INFILTRATION; PERINEURAL AS NEEDED
Status: DISCONTINUED | OUTPATIENT
Start: 2023-10-23 | End: 2023-10-23 | Stop reason: HOSPADM

## 2023-10-23 RX ORDER — LIDOCAINE HYDROCHLORIDE 20 MG/ML
INJECTION, SOLUTION EPIDURAL; INFILTRATION; INTRACAUDAL; PERINEURAL AS NEEDED
Status: DISCONTINUED | OUTPATIENT
Start: 2023-10-23 | End: 2023-10-23 | Stop reason: HOSPADM

## 2023-10-23 RX ORDER — LIDOCAINE HYDROCHLORIDE 10 MG/ML
0.5 INJECTION, SOLUTION INFILTRATION; PERINEURAL ONCE AS NEEDED
Status: DISCONTINUED | OUTPATIENT
Start: 2023-10-23 | End: 2023-10-23 | Stop reason: HOSPADM

## 2023-10-23 RX ORDER — NALOXONE HCL 0.4 MG/ML
0.2 VIAL (ML) INJECTION AS NEEDED
Status: DISCONTINUED | OUTPATIENT
Start: 2023-10-23 | End: 2023-10-23 | Stop reason: HOSPADM

## 2023-10-23 RX ORDER — SODIUM CHLORIDE 0.9 % (FLUSH) 0.9 %
10 SYRINGE (ML) INJECTION EVERY 12 HOURS SCHEDULED
Status: DISCONTINUED | OUTPATIENT
Start: 2023-10-23 | End: 2023-10-23 | Stop reason: HOSPADM

## 2023-10-23 RX ORDER — MIDAZOLAM HYDROCHLORIDE 1 MG/ML
1 INJECTION INTRAMUSCULAR; INTRAVENOUS
Status: DISCONTINUED | OUTPATIENT
Start: 2023-10-23 | End: 2023-10-23 | Stop reason: HOSPADM

## 2023-10-23 RX ORDER — FENTANYL CITRATE 50 UG/ML
50 INJECTION, SOLUTION INTRAMUSCULAR; INTRAVENOUS
Status: DISCONTINUED | OUTPATIENT
Start: 2023-10-23 | End: 2023-10-23 | Stop reason: HOSPADM

## 2023-10-23 RX ORDER — ONDANSETRON 2 MG/ML
INJECTION INTRAMUSCULAR; INTRAVENOUS AS NEEDED
Status: DISCONTINUED | OUTPATIENT
Start: 2023-10-23 | End: 2023-10-23 | Stop reason: SURG

## 2023-10-23 RX ORDER — SODIUM CHLORIDE 0.9 % (FLUSH) 0.9 %
3 SYRINGE (ML) INJECTION EVERY 12 HOURS SCHEDULED
Status: DISCONTINUED | OUTPATIENT
Start: 2023-10-23 | End: 2023-10-23 | Stop reason: HOSPADM

## 2023-10-23 RX ADMIN — LIDOCAINE HYDROCHLORIDE 60 MG: 20 INJECTION, SOLUTION INFILTRATION; PERINEURAL at 07:57

## 2023-10-23 RX ADMIN — PROPOFOL 50 MG: 10 INJECTION, EMULSION INTRAVENOUS at 07:57

## 2023-10-23 RX ADMIN — ONDANSETRON 4 MG: 2 INJECTION INTRAMUSCULAR; INTRAVENOUS at 08:50

## 2023-10-23 RX ADMIN — FENTANYL CITRATE 25 MCG: 0.05 INJECTION, SOLUTION INTRAMUSCULAR; INTRAVENOUS at 08:10

## 2023-10-23 RX ADMIN — FENTANYL CITRATE 25 MCG: 0.05 INJECTION, SOLUTION INTRAMUSCULAR; INTRAVENOUS at 07:57

## 2023-10-23 RX ADMIN — VANCOMYCIN HYDROCHLORIDE 1500 MG: 1 INJECTION, POWDER, LYOPHILIZED, FOR SOLUTION INTRAVENOUS at 07:10

## 2023-10-23 RX ADMIN — PROPOFOL INJECTABLE EMULSION 140 MCG/KG/MIN: 10 INJECTION, EMULSION INTRAVENOUS at 07:57

## 2023-10-23 RX ADMIN — SODIUM CHLORIDE, POTASSIUM CHLORIDE, SODIUM LACTATE AND CALCIUM CHLORIDE 1000 ML: 600; 310; 30; 20 INJECTION, SOLUTION INTRAVENOUS at 07:08

## 2023-10-23 RX ADMIN — ACETAMINOPHEN 1000 MG: 500 TABLET ORAL at 07:43

## 2023-10-23 NOTE — H&P
[facility]   HISTORY AND PHYSICAL    Patient Name: Carlos Reese  : 1970  MRN: [id number]  Primary Care Physician:  Nicolle Nassar APRN  Date of admission: 10/23/2023    Subjective   Subjective     Chief Complaint: Painful diabetic neuropathy of the lower extremities    History of Present Illness  Mr. Reese presents today with continued complaints of painful diabetic neuropathy in bilateral lower extremities.  He had a successful spinal cord stimulator trial with Nevro and would like to move forward with permanent implantation today.       Review of Systems   Constitutional:  Negative for chills and fever.   Respiratory:  Negative for cough and shortness of breath.    Musculoskeletal:  Positive for back pain.        Personal History     Past Medical History:   Diagnosis Date    Arthritis     Asthma     GERD (gastroesophageal reflux disease)     Other chest pain     Type 2 diabetes mellitus with complication, without long-term current use of insulin        Past Surgical History:   Procedure Laterality Date    CARDIAC CATHETERIZATION N/A 2021    Procedure: Coronary angiography;  Surgeon: Kathy Galvez MD;  Location: [facility] INVASIVE LOCATION;  Service: Cardiovascular;  Laterality: N/A;    CARDIAC CATHETERIZATION N/A 2021    Procedure: Left heart cath;  Surgeon: Kathy Galvez MD;  Location: [facility] INVASIVE LOCATION;  Service: Cardiovascular;  Laterality: N/A;    CARDIAC CATHETERIZATION N/A 2021    Procedure: Left ventriculography;  Surgeon: Kathy Galvez MD;  Location: [facility] INVASIVE LOCATION;  Service: Cardiovascular;  Laterality: N/A;    COLONOSCOPY  2014    EPIDURAL Bilateral 2/15/2023    Procedure: bilateral S1 lumbar transforaminal epidural steroid injection 12577;  Surgeon: Katerina Abraham MD;  Location: [facility] [place] [facility];  Service: Pain Management;  Laterality: Bilateral;    HERNIA REPAIR      SHOULDER SURGERY      SPINAL CORD STIMULATOR IMPLANT N/A 2023     Procedure: SPINAL CORD STIMULATOR INSERTION PHASE 1 COURTNEYRO;  Surgeon: Katerina Abraham MD;  [place] [facility] [place];  Service: Pain Management;  Laterality: N/A;       Family History: family history includes Cancer in his father; Heart attack in his paternal grandmother; Heart disease in his paternal grandmother; Hypertension in his mother; Kidney disease in his mother; Stroke in his mother. Otherwise pertinent FHx was reviewed and not pertinent to current issue.    Social History:  reports that he has never smoked. He has quit using smokeless tobacco.  His smokeless tobacco use included chew. He reports current alcohol use of about 5.0 standard drinks of alcohol per week. He reports that he does not use drugs.    Home Medications:  Alogliptin Benzoate, Blood Glucose Monitoring Suppl, Insulin Glargine Solostar, OneTouch Delica Plus [id number], Semaglutide (2 MG/DOSE), albuterol, albuterol sulfate HFA, amitriptyline, aspirin, atorvastatin, cyclobenzaprine, dapagliflozin Propanediol, ezetimibe, glucose blood, isosorbide mononitrate, lisinopril, meloxicam, metFORMIN, metoprolol succinate XL, omeprazole, pregabalin, and tamsulosin    Allergies:  No Known Allergies    Objective    Objective     Vitals:   Temp:  [97.6 °F (36.4 °C)] 97.6 °F (36.4 °C)  Heart Rate:  [79] 79  Resp:  [18] 18  BP: (149)/(96) 149/96    Physical Exam  Constitutional:       General: He is not in acute distress.  Pulmonary:      Effort: Pulmonary effort is normal. No respiratory distress.   Skin:     General: Skin is warm and dry.   Neurological:      Mental Status: He is alert.   Psychiatric:         Mood and Affect: Mood normal.         Thought Content: Thought content normal.         Result Review    Result Review:  I have personally reviewed the results from the time of this admission to 10/23/2023 07:26 EDT and agree with these findings:  []  Laboratory list / accordion  []  Microbiology  []  Radiology  []  EKG/Telemetry   []   Cardiology/Vascular   []  Pathology  []  Old records  []  Other:  Most notable findings include: No new       Assessment & Plan   Assessment / Plan     Brief Patient Summary:  Carlos Reese is a 52 y.o. male who has chronic painful diabetic neuropathy of bilateral lower extremities.     Active Hospital Problems:  Active Hospital Problems    Diagnosis     **Diabetic peripheral neuropathy      Plan: Nevro permanent implant of spinal cord stimulator      DVT prophylaxis:  No DVT prophylaxis order currently exists.      Katerina Abraham MD

## 2023-10-23 NOTE — OP NOTE
Spinal Cord Stimulation - Permanent Implantation of Dorsal Column Stimulator  [facility]    SURGEON:   Katerina Abraham MD    Preop Dx: Chronic low back pain, Chronic Pain Syndrome, and Painful diabetic neuropathy  Postop Dx: Same as preop dx    SCS System: Nevro    Operative Findings:  1. Successful implantation of a dorsal column stimulator with lead tips at the superior of the 8th thoracic vertebrae and the inferior 8th thoracic vertebrae.  2. Epidural Access obtained at T12/L1  3. Placement of a dorsal column stimulator generator on the right flank region.  4. Lead Lot/Serial Numbers: [id number], [id number]  5. Generator Serial Number: [id number], [id number], [id number]      Preprocedure Discussion with Patient:  Risks and complications were discussed with the patient prior to starting the procedure and informed consent was obtained.  We discussed various topics including but not limited to bleeding, infection, injury, allergic reactions, spinal cord and/or neural injury, implant site pain, post procedural site soreness, equipment malfunction including but not limited to lead fracture or lead migration or risk of electrical shock or risk of loss stimulation, risk of the lack of pain relief, and risk of worsening clinical picture.    Reason for procedure:  Painful diabetic neuropathy.  This patient had a favorable psychological profile noted prior to trialing.  The SCS phase 1 trial was successful as previously noted in the chart.    Sedation:  Monitored Anesthesia Care with Local Anesthetic  Antibiotics:   Vancomycin 1.5 g IV (for patient 100-120 kg)    Description of Procedure:    After obtaining informed consent, IV access had been obtained by nursing staff in the preoperative area.  The patient was transported to the operative suite and was placed in a prone position.  Appropriate padding was placed under the patient's abdomen.   Anesthesia monitors were applied to the patient and vitals were  noted to be stable throughout the procedure. The skin overlying the back was prepped with 70% isopropanol x 3 and with chloraprep x 2.  Sterile drapes were then applied in usual sterile fashion.  A pre-procedure time-out was performed to ensure that the correct procedure was being performed on the correct patient.  Antibiotics had been started at an appropriate interval prior to initiation of the procedure.    The midline incision site was marked with fluoroscopic guidance.  The skin and deep tissues over the site was anesthetized with 10 mLs of a local anesthetic solution containing equal parts 0.25% bupivicaine with 1:200,000 epinephrine and 2% lidocaine.  A 15 blade scalpel was used to incise the skin.  Electrocautery was used to dissect down to the level of the thoracodorsal fascia and achieve hemostasis.    Next, 14 ga coude needles were used to access the epidural space at the T12/L1 using the loss of resistance technique and intermittent projection of fluoroscopy.  The epidural space was accessed on the first pass in a paramedian fashion.  After negative aspiration, dual octad leads were threaded through the needles and were advanced under live fluoroscopic visualization until the tips were noted to be at the superior of the 8th thoracic vertebrae and inferior 8th thoracic vertebrae.  The device representative confirmed proper positioning through visualization with fluoroscopy.    An additional 10 mLs of the same local anesthetic solution was used to anesthetize the superficial and deep tissues at the previously marked generator pocket.  A 15 blade scalpel was used to dissect the skin.  Electrocautery was used to achieve hemostasis and to dissect the deeper tissues to create an appropriately sized pocket for the generator.  A peroxide soaked sponge was placed within the wound.    The needles and stylets were withdrawn under live visualization to ensure that the lead tips did not migrate.  Next, anchoring  devices were placed onto each of the leads.  Fixate device was used to anchor the leads to the fascia at this location.  After confirming lead tip placement with an additional fluoroscopic image, a tract of local anesthesia was injected from the midline wound towards the pocket with 8 mLs of the same local anesthetic solution.  A tunneling device was used to create a passage for the leads.  The leads were passed from the midline towards the pocket where they were connected to the generator.  Both wounds were irrigated with copious amounts of preservative free normal saline and were rinsed with peroxide.  The generator was interrogated by the device representative, who confirmed that the device was functioning appropriately.      The deep tissues were closed with 2-0 Vicryl.  The superficial layer was closed with 3-0 vicryl.  Staples were used to approximate the skin. A sterile dressing was applied to each of the two incision sites.      Estimated Blood Loss: 50 mL  Specimens:   None  Complications:  No complications were noted. and There was minimal amount of bleeding which was easily addressed with application of pressure and a bandage.  Fluids:    600mL  Drains/Packs:   None    Tolerance and discharge condition:    The patient tolerated the procedure well and was awakened from anesthesia without incident.  The patient was transported to the recovery area without difficulties.  Further device programming was performed by the spinal cord stimulation device representative in the recovery area.  The patient was again cautioned not to drive at this time and avoid strenuous activities and to avoid reaching overhead and to avoid bending and avoid lifting objects over 5 pounds.  The patient was discharged home under the care of family members in stable and satisfactory condition.      Plan of care:    The patient was given our standard instruction sheet and will resume all medications as per the medication reconciliation  sheet.  The patient will return to my office at the appropriate time scheduled in about one business day with the stimulation device representative for further device programming and education about device function if necessary.  The patient will also present to my office in 10-14 days for a postoperative wound check.    The patient understands that there is any signs of complication, bleeding, infection, fever, chills, increasing back pain or spine pain, any neurologic deficit, or any other concerning finding, that the patient of a family member should call my office at [phone] at any time to access the answering service.    INITIAL STIMULATION SETTINGS:  Per device representative    Prescriptions Provided: Keflex 500mg PO BID #14, Hydrocodone-acetaminophen 7.5-325mg #30.

## 2023-10-23 NOTE — ANESTHESIA PREPROCEDURE EVALUATION
Anesthesia Evaluation     Patient summary reviewed and Nursing notes reviewed   no history of anesthetic complications:   NPO Solid Status: > 8 hours  NPO Liquid Status: > 2 hours           Airway   Mallampati: II  TM distance: >3 FB  Neck ROM: full  Dental      Pulmonary    (+) asthma,sleep apnea  Cardiovascular     ECG reviewed    (+) hyperlipidemia      Neuro/Psych  GI/Hepatic/Renal/Endo    (+) obesity, GERD, diabetes mellitus    Musculoskeletal     (+) chronic pain  Abdominal    Substance History      OB/GYN          Other                          Anesthesia Plan    ASA 3     MAC     intravenous induction     Anesthetic plan, risks, benefits, and alternatives have been provided, discussed and informed consent has been obtained with: patient.        CODE STATUS:

## 2023-10-23 NOTE — ANESTHESIA POSTPROCEDURE EVALUATION
Patient: Carlos Reese    Procedure Summary       Date: 10/23/23 Room / Location: [facility]    Anesthesia Start: 0749 Anesthesia Stop: 0955    Procedure: SPINAL CORD STIMULATOR INSERTION PHASE 2 (NEVRO) (Back) Diagnosis:       Diabetic peripheral neuropathy      (Diabetic peripheral neuropathy [E11.42])    Surgeons: Katerina Abraham MD Provider: Wilfredo Kaufman MD    Anesthesia Type: MAC ASA Status: 3            Anesthesia Type: MAC    Vitals  Vitals Value Taken Time   /68 10/23/23 0950   Temp 36.8 °C (98.2 °F) 10/23/23 0945   Pulse 81 10/23/23 0950   Resp 18 10/23/23 0950   SpO2 95 % 10/23/23 0950           Post Anesthesia Care and Evaluation    Patient location during evaluation: bedside  Patient participation: complete - patient participated  Level of consciousness: awake and alert  Pain management: adequate    Airway patency: patent  Anesthetic complications: No anesthetic complications  PONV Status: controlled  Cardiovascular status: blood pressure returned to baseline and acceptable  Respiratory status: acceptable  Hydration status: acceptable

## 2023-10-30 ENCOUNTER — TELEPHONE (OUTPATIENT)
Dept: PAIN MEDICINE | Facility: CLINIC | Age: 53
End: 2023-10-30

## 2023-10-30 NOTE — TELEPHONE ENCOUNTER
Hub staff attempted to follow warm transfer process and was unsuccessful     Caller: SOLEDAD WILLARD    Relationship to patient: DAUGHTER    Best call back number: [phone]    Patient is needing: PATIENT'S DAUGHTER, SOLEDAD WAS CALLING TO DISCUSS A FEW QUESTIONS THAT SHE HAS IN REGARDS HER FATHER'S SPINAL CORD STIMULATOR. SOLEDAD WANTED TO KNOW HOW TO TURN THE SPINAL CORD STIMULATOR OFF? SOLEDAD MENTIONED THE PAPERWORK STATED TO TURN THE SPINAL CORD STIMULATOR OFF WHILE DRIVING BUT SHE WAS UNSURE HOW TO DO THAT. SOLEDAD ALSO WANTED TO KNOW WHEN YOU TURN THE STIMULATOR BACK ON DOES IT STAY ON THE SAME SETTING AS BEFORE? SOLEDAD'S LAST QUESTION WAS HOW LONG SHOULD THE PATIENT TAKE OFF HIS WRAP WITH VELCRO TO LET IT AIR OUT? SOLEDAD WOULD LIKE A CALL BACK TO DISCUSS HER QUESTIONS. THANK YOU!

## 2023-11-01 NOTE — TELEPHONE ENCOUNTER
Tish from [facility] is going to educate them on the device. Please advise on binder.

## 2023-11-01 NOTE — TELEPHONE ENCOUNTER
He does not have to wear it all the time. Can take it off periodically for comfort.  There is no specific recommendation on this, just wear it as much as tolerated until we can look at the incision and see that it is healing well.

## 2023-11-02 NOTE — TELEPHONE ENCOUNTER
"  Caller: Carlos Reese \"Hansel\"    Relationship: Self    Best call back number: [phone]    What is the best time to reach you: N/A     Who are you requesting to speak with (clinical staff, provider,  specific staff member):N/A     Do you know the name of the person who called: PATIENT WAS RETURNING A CALL     What was the call regarding: PATIENT WAS RETURNING A CALL TO THE OFFICE BUT WAS NOT SURE WHO CALLED OR WHY. HE CONFIRMED HIS APPT FOR MONDAY AND STATED IF THE OFFICE NEEDED ANYTHING ELSE TO LET HIM KNOW.     Is it okay if the provider responds through MyChart: YES          "

## 2023-11-06 ENCOUNTER — OFFICE VISIT (OUTPATIENT)
Dept: PAIN MEDICINE | Facility: CLINIC | Age: 53
End: 2023-11-06
Payer: COMMERCIAL

## 2023-11-06 VITALS
HEART RATE: 92 BPM | WEIGHT: 233 LBS | BODY MASS INDEX: 36.57 KG/M2 | OXYGEN SATURATION: 95 % | SYSTOLIC BLOOD PRESSURE: 108 MMHG | TEMPERATURE: 97.3 F | DIASTOLIC BLOOD PRESSURE: 70 MMHG | HEIGHT: 67 IN

## 2023-11-06 DIAGNOSIS — M51.36 DDD (DEGENERATIVE DISC DISEASE), LUMBAR: ICD-10-CM

## 2023-11-06 DIAGNOSIS — Z96.89 S/P INSERTION OF SPINAL CORD STIMULATOR: ICD-10-CM

## 2023-11-06 DIAGNOSIS — R20.2 PARESTHESIA OF BILATERAL LEGS: ICD-10-CM

## 2023-11-06 DIAGNOSIS — E11.42 DIABETIC PERIPHERAL NEUROPATHY: Primary | ICD-10-CM

## 2023-11-06 PROCEDURE — 99024 POSTOP FOLLOW-UP VISIT: CPT

## 2023-11-06 PROCEDURE — 1125F AMNT PAIN NOTED PAIN PRSNT: CPT

## 2023-11-06 NOTE — PROGRESS NOTES
CHIEF COMPLAINT  SCS Phase 2 implant     Subjective   Carlos Reese is a 52 y.o. male  who presents to the office for follow-up of procedure.  He completed a SCS Phase 2 insertion on 10/23/23 performed by Dr. Abraham for management of back pain. Patient reports moderate relief from the procedure. He reports that he has been able to rest better at night. He is pleased with his results.    Procedures:  10/23/23 - SCS Phase 2 (Nev[facility]) - ongoing moderate relief  9/11/23 - SCS Phase 1 ([facility]evro) - 60% relief during trial    Back Pain  Associated symptoms include numbness (bilateral leg and bilateral foot) and weakness (bilateral leg and bilateral foot). Pertinent negatives include no abdominal pain, dysuria or fever.      PEG Assessment   What number best describes your pain on average in the past week?5  What number best describes how, during the past week, pain has interfered with your enjoyment of life?7  What number best describes how, during the past week, pain has interfered with your general activity?  7    Review of Pertinent Medical Data ---  MRI OF THE LUMBAR SPINE WITHOUT CONTRAST     CLINICAL HISTORY:Chronic low back pain and bilateral posterior leg pain.     TECHNIQUE: MRI of the lumbar spine was obtained with sagittal T1,  proton-density, and T2-weighted images. Additionally, there are axial T1  and T2-weighted images through the lumbar spine.     COMPARISON:Lumbar spine MRI dated 05/24/2020.     FINDINGS:     There is transitional anatomy at the level of the lumbosacral junction.  The transitional vertebral body has been enumerated as a relatively  lumbarized S1 segment for the purposes of this report. Please take  careful note of this enumeration system at the time of any potential  intervention.     Utilizing this enumeration system, the conus medullaris terminates at  the level of the mid body of L2. The conus medullaris as well as the  visualized distal thoracic spinal cord have a normal signal  intensity.     At L1-L2, there is no significant canal or foraminal stenosis.     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     At the L2-L3 level, there is a minimal disc bulge. The combination of  this minimal bulging disc material and shortened pedicles result in a  mild degree of canal narrowing. There is minimal left foraminal  narrowing secondary to the bulging disc material. When compared to the  prior study dated 05/24/2020, there is no significant interval change.     At L3-L4, there is a mild disc bulge and the combination of the mild  disc bulging and the congenitally shortened pedicles results in a mild  degree of central canal stenosis. There is minimal foraminal narrowing  secondary to bulging disc material. When compared to the prior  examination, the canal narrowing is very similar. A mild degree of left  foraminal narrowing seen secondary to bulging disc material and again  the findings are stable. A posterior annular fissure is noted at L3-L4.     At L4-L5, there is a disc bulge eccentric to the right which results in  a mild degree of canal stenosis in conjunction with the congenitally  shortened pedicles. Again, similar findings were noted on the prior  exam. There is degenerative retrolisthesis of L4 on L5 by approximately  2 mm. A posterior annular fissure is seen at L4-L5.     At L5-S1, there is degenerative anterior spondylolisthesis of L5 on S1  by approximately 2 mm. Bulging disc material and moderate-to-severe  facet hypertrophic change results in a mild degree of bilateral  foraminal narrowing. When compared to the prior study, again, no  significant interval change is seen.     IMPRESSION:  When compared to the prior study dated 05/24/2020, there is no  significant interval change.     There is transitional anatomy at the level the lumbosacral junction and  the transitional vertebral body has been enumerated as a  relatively  lumbarized S1 segment for the purposes of this report. Please take  careful note of this enumeration system at the time of any potential  intervention.     The spinal canal is relatively narrow on a congenital basis secondary to  shortened pedicles from L2 down to L5. Superimposed degenerative  phenomena with bulging disc material result in relatively mild degrees  of canal narrowing from L2-L3 down to L4-L5. The most prominent canal  stenosis is seen at the L3-L4 level where there is a relatively mild  degree of canal stenosis secondary to bulging disc material and the  aforementioned congenital phenomena. Multilevel relatively mild  foraminal narrowing is also identified as discussed in detail above.     There is degenerative anterior spondylolisthesis of L5 on S1 by  approximately 2 mm and degenerative retrolisthesis of L4 on L5 by  approximately 2 mm.     This report was finalized on 1/27/2023 8:40 AM by Dr. Bryon Farris M.D.    The following portions of the patient's history were reviewed and updated as appropriate: allergies, current medications, past family history, past medical history, past social history, past surgical history, and problem list.    Review of Systems   Constitutional:  Negative for chills, fatigue and fever.   Respiratory:  Negative for cough and shortness of breath.    Gastrointestinal:  Negative for abdominal pain, constipation and diarrhea.   Genitourinary:  Negative for difficulty urinating and dysuria.   Musculoskeletal:  Positive for back pain.   Neurological:  Positive for weakness (bilateral leg and bilateral foot) and numbness (bilateral leg and bilateral foot). Negative for dizziness and light-headedness.   Psychiatric/Behavioral:  Negative for sleep disturbance.        I have reviewed and confirmed the accuracy of the ROS as documented by the MA/LPN/RN JENNA Rocha     Vitals:    11/06/23 1253   BP: 108/70   BP Location: Left arm   Patient Position: Sitting  "  Pulse: 92   Temp: 97.3 °F (36.3 °C)   TempSrc: Temporal   SpO2: 95%   Weight: 106 kg (233 lb)   Height: 170.2 cm (67\")   PainSc:   4   PainLoc: Leg     Objective   Physical Exam  Constitutional:       Appearance: Normal appearance.   HENT:      Head: Normocephalic.   Cardiovascular:      Rate and Rhythm: Normal rate and regular rhythm.   Pulmonary:      Effort: Pulmonary effort is normal.      Breath sounds: Normal breath sounds.   Musculoskeletal:         General: Normal range of motion.      Cervical back: Normal range of motion.        Back:    Skin:     General: Skin is warm and dry.      Capillary Refill: Capillary refill takes less than 2 seconds.   Neurological:      General: No focal deficit present.      Mental Status: He is alert and oriented to person, place, and time.   Psychiatric:         Mood and Affect: Mood normal.         Behavior: Behavior normal.         Thought Content: Thought content normal.         Cognition and Memory: Cognition normal.       Assessment & Plan   Diagnoses and all orders for this visit:    1. Diabetic peripheral neuropathy (Primary)    2. S/P insertion of spinal cord stimulator    3. DDD (degenerative disc disease), lumbar    4. Paresthesia of bilateral legs      [place]brooke Reese reports a pain score of 4.  Given his pain assessment as noted, treatment options were discussed and the following options were decided upon as a follow-up plan to address the patient's pain: continuation of current treatment plan for pain.    --- Discussed with patient that if he were to develop any signs and symptoms of infection such as increased redness/pain at incision site, fever, increased drainage and/or foul odor he should contact our office for further evaluation.  --- SCS was reprogrammed during this office visit by [employer] rep. I was not involved in any aspect of the reprogramming process.  --- Follow-up as needed       EMMA REPORT  As part of the patient's treatment plan, I am " prescribing controlled substances. The patient has been made aware of appropriate use of such medications, including potential risk of somnolence, limited ability to drive and/or work safely, and the potential for dependence or overdose. It has also been made clear that these medications are for use by this patient only, without concomitant use of alcohol or other substances unless prescribed.     Patient has completed prescribing agreement detailing terms of continued prescribing of controlled substances, including monitoring EMMA reports, urine drug screening, and pill counts if necessary. The patient is aware that inappropriate use will results in cessation of prescribing such medications.    As the clinician, I personally reviewed the EMMA from 11/6/23 while the patient was in the office today.    History and physical exam exhibit continued safe and appropriate use of controlled substances.    Dictated utilizing Dragon dictation.

## 2023-11-08 DIAGNOSIS — E11.42 DIABETIC PERIPHERAL NEUROPATHY: ICD-10-CM

## 2023-11-09 RX ORDER — PREGABALIN 150 MG/1
CAPSULE ORAL
Qty: 90 CAPSULE | Refills: 2 | Status: SHIPPED | OUTPATIENT
Start: 2023-11-09

## 2024-05-17 DIAGNOSIS — E11.42 DIABETIC PERIPHERAL NEUROPATHY: ICD-10-CM

## 2024-05-17 RX ORDER — PREGABALIN 150 MG/1
CAPSULE ORAL
Qty: 90 CAPSULE | Refills: 2 | Status: SHIPPED | OUTPATIENT
Start: 2024-05-17

## 2024-08-20 DIAGNOSIS — E11.42 DIABETIC PERIPHERAL NEUROPATHY: ICD-10-CM

## 2024-08-22 RX ORDER — PREGABALIN 150 MG/1
CAPSULE ORAL
Qty: 90 CAPSULE | Refills: 2 | OUTPATIENT
Start: 2024-08-22

## (undated) DEVICE — TOWEL,OR,DSP,ST,BLUE,STD,4/PK,20PK/CS: Brand: MEDLINE

## (undated) DEVICE — NDL SPINE 25G 31/2IN BLU

## (undated) DEVICE — Device: Brand: PORTEX

## (undated) DEVICE — APPL CHLORAPREP HI/LITE 26ML ORNG

## (undated) DEVICE — PK MINOR PROCEDURE 46

## (undated) DEVICE — TRIAL STIMULATOR 3500 KIT: Brand: SENZA

## (undated) DEVICE — DRAPE,REIN 53X77,STERILE: Brand: MEDLINE

## (undated) DEVICE — GAUZE SPONGES,12 PLY: Brand: CURITY

## (undated) DEVICE — GOWN,AURORA,NONREINFORCED,LARGE: Brand: MEDLINE

## (undated) DEVICE — GW EMR FIX EXCHG J STD .035 3MM 260CM

## (undated) DEVICE — 3M™ IOBAN™ 2 ANTIMICROBIAL INCISE DRAPE 6650EZ: Brand: IOBAN™ 2

## (undated) DEVICE — DEV SUT FIXATE BND TISS DUAL FB20101

## (undated) DEVICE — BNDR ABD 4PANEL 12IN 46 TO 62IN

## (undated) DEVICE — SOL ANTISEP SCRB HIBICLENS CHG4PCT 4OZ

## (undated) DEVICE — SOL ISO/ALC RUB 91PCT 16OZ

## (undated) DEVICE — TSM POUCH KIT: Brand: SENZA

## (undated) DEVICE — PK CATH CARD 40

## (undated) DEVICE — CATH DIAG IMPULSE FR4 5F 100CM

## (undated) DEVICE — SYR LL TP 10ML STRL

## (undated) DEVICE — GLIDESHEATH SLENDER STAINLESS STEEL KIT: Brand: GLIDESHEATH SLENDER

## (undated) DEVICE — MARKR SKIN W/RULR AND LBL

## (undated) DEVICE — DRP C/ARM 41X74IN

## (undated) DEVICE — ADHS LIQ MASTISOL 2/3ML

## (undated) DEVICE — SUREFIT, DUAL DISPERSIVE ELECTRODE, CONTACT QUALITY MONITOR: Brand: SUREFIT

## (undated) DEVICE — EPIDURAL TRAY: Brand: MEDLINE INDUSTRIES, INC.

## (undated) DEVICE — STRIP,CLOSURE,WOUND,MEDI-STRIP,1/2X4: Brand: MEDLINE

## (undated) DEVICE — KT MANIFLD CARDIAC

## (undated) DEVICE — UNDYED BRAIDED (POLYGLACTIN 910), SYNTHETIC ABSORBABLE SUTURE: Brand: COATED VICRYL

## (undated) DEVICE — NDL EPI COUDE RX 14G 4IN

## (undated) DEVICE — CATH DIAG IMPULSE FL3.5 5F 100CM

## (undated) DEVICE — CATH VENT MIV RADL PIG ST TIP 5F 110CM

## (undated) DEVICE — DRSNG SLVR/ANTIBAC PRIMASEAL POST/OP ADHS 3.5X6IN

## (undated) DEVICE — STPLR SKIN VISISTAT WD 35CT

## (undated) DEVICE — PATIENT REMOTE 3000 KIT: Brand: OMNIA

## (undated) DEVICE — TBG PENCL TELESCP MEGADYNE SMOKE EVAC 10FT

## (undated) DEVICE — ANTIBACTERIAL UNDYED BRAIDED (POLYGLACTIN 910), SYNTHETIC ABSORBABLE SUTURE: Brand: COATED VICRYL

## (undated) DEVICE — DRAPE,CHEST,FENES,15X10,STERIL: Brand: MEDLINE

## (undated) DEVICE — MEDICINE CUP, GRADUATED, STER: Brand: MEDLINE

## (undated) DEVICE — GLV SURG TRIUMPH PF LTX 7 STRL

## (undated) DEVICE — STERILE POLYISOPRENE POWDER-FREE SURGICAL GLOVES: Brand: PROTEXIS

## (undated) DEVICE — GOWN,AURORA,NONREINFORCED,XLARGE: Brand: MEDLINE

## (undated) DEVICE — NEEDLE, QUINCKE 25GX3.5": Brand: MEDLINE

## (undated) DEVICE — 3M™ TEGADERM™ IV ADVANCED SECUREMENT DRESSING 4 INCHES X 4 3/4 INCHES 50 DRESSINGS/CARTON 4 CARTONS/CASE 1688: Brand: 3M™ TEGADERM™

## (undated) DEVICE — GAUZE,SPONGE,4"X4",16PLY,XRAY,STRL,LF: Brand: MEDLINE

## (undated) DEVICE — NDL SPINE 22G 31/2IN BLK

## (undated) DEVICE — IRRIGATOR BULB ASEPTO 60CC STRL

## (undated) DEVICE — COVER,TABLE,44X90,STERILE: Brand: MEDLINE

## (undated) DEVICE — GLV SURG SENSICARE PI LF PF 7.0

## (undated) DEVICE — DRSNG SURESITE123 6X8IN